# Patient Record
Sex: MALE | Race: WHITE | NOT HISPANIC OR LATINO | Employment: FULL TIME | URBAN - METROPOLITAN AREA
[De-identification: names, ages, dates, MRNs, and addresses within clinical notes are randomized per-mention and may not be internally consistent; named-entity substitution may affect disease eponyms.]

---

## 2017-02-27 ENCOUNTER — TELEPHONE (OUTPATIENT)
Dept: FAMILY MEDICINE | Facility: OTHER | Age: 52
End: 2017-02-27

## 2017-02-27 NOTE — TELEPHONE ENCOUNTER
Panel Management Review      Patient has the following on his problem list:     Diabetes    ASA: Passed    Last A1C  Lab Results   Component Value Date    A1C 6.2 11/21/2016    A1C 6.1 11/17/2015    A1C 6.3 04/29/2015    A1C 6.1 02/19/2015    A1C 6.3 10/20/2014     A1C tested: Failed    Last LDL:    Lab Results   Component Value Date    CHOL 181 11/21/2016     Lab Results   Component Value Date    HDL 44 11/21/2016     Lab Results   Component Value Date     11/21/2016     Lab Results   Component Value Date    TRIG 158 11/21/2016     Lab Results   Component Value Date    CHOLHDLRATIO 3.0 02/19/2015     Lab Results   Component Value Date    NHDL 137 11/21/2016       Is the patient on a Statin? YES             Is the patient on Aspirin? YES    Medications     HMG CoA Reductase Inhibitors    atorvastatin (LIPITOR) 10 MG tablet    Salicylates    aspirin 81 MG tablet          Last three blood pressure readings:  BP Readings from Last 3 Encounters:   11/21/16 132/84   04/15/16 112/68   12/17/15 130/74       Date of last diabetes office visit: 11/21/16     Tobacco History:     History   Smoking Status     Never Smoker   Smokeless Tobacco     Never Used         Hypertension   Last three blood pressure readings:  BP Readings from Last 3 Encounters:   11/21/16 132/84   04/15/16 112/68   12/17/15 130/74     Blood pressure: Passed    HTN Guidelines:  Age 18-59 BP range:  Less than 140/90  Age 60-85 with Diabetes:  Less than 140/90  Age 60-85 without Diabetes:  less than 150/90      Composite cancer screening  Chart review shows that this patient is due/due soon for the following None  Summary:    Patient is due/failing the following:   creatinine, A1C and LDL    Action needed:   Patient needs fasting lab only appointment    Type of outreach:    Phone, left message for patient to call back.     Questions for provider review:    None                                                                                                                                     Ese Torres MA       Chart routed to Care Team .

## 2017-03-07 NOTE — TELEPHONE ENCOUNTER
Patient states he was advised at last OV on 11/21/16 to follow up in 6 months. He will schedule in May.

## 2017-04-19 DIAGNOSIS — E78.5 HYPERLIPIDEMIA LDL GOAL <100: ICD-10-CM

## 2017-04-19 NOTE — TELEPHONE ENCOUNTER
atorvastatin (LIPITOR) 10 MG tablet     Last Written Prescription Date: 11/21/16  Last Fill Quantity: 30, # refills: 5  Last Office Visit with G, P or [facility] prescribing provider: 11/21/16       Lab Results   Component Value Date    CHOL 181 11/21/2016     Lab Results   Component Value Date    HDL 44 11/21/2016     Lab Results   Component Value Date     11/21/2016     Lab Results   Component Value Date    TRIG 158 11/21/2016     Lab Results   Component Value Date    CHOLHDLRATIO 3.0 02/19/2015

## 2017-04-20 RX ORDER — ATORVASTATIN CALCIUM 10 MG/1
TABLET, FILM COATED ORAL
Qty: 30 TABLET | Refills: 5 | Status: SHIPPED | OUTPATIENT
Start: 2017-04-20 | End: 2017-11-17

## 2017-04-20 NOTE — TELEPHONE ENCOUNTER
Prescription approved per [facility] [place].  Shahab Ortzi RN

## 2017-05-30 ENCOUNTER — TELEPHONE (OUTPATIENT)
Dept: FAMILY MEDICINE | Facility: OTHER | Age: 52
End: 2017-05-30

## 2017-05-30 ENCOUNTER — MYC MEDICAL ADVICE (OUTPATIENT)
Dept: FAMILY MEDICINE | Facility: OTHER | Age: 52
End: 2017-05-30

## 2017-05-30 NOTE — PROGRESS NOTES
"  SUBJECTIVE:                                                    Taiwo Escobar is a 51 year old male who presents to clinic today for the following health issues:    HPI    Diabetes Follow-up      Patient is checking blood sugars: 2-3 times a week, 125-140 in the morning, evenings are 100-125    Diabetic concerns: None     Symptoms of hypoglycemia (low blood sugar): none     Paresthesias (numbness or burning in feet) or sores: No     Date of last diabetic eye exam: 11/2016     He states his diabetes has been under good control.    He checks home blood pressures and the systolic is usually in the 130s and the diastolic is usually in the 70's.     He is tolerating the Lipitor well without any muscle aching.     Problem list and histories reviewed & adjusted, as indicated.  Additional history: none    ROS:  GENERAL: Denies fever, fatigue, weakness, weight gain, or weight loss.  CARDIOVASCULAR: Denies chest pain, shortness of breath, irregular heartbeats, palpitations, or edema.  RESPIRATORY: Denies cough, hemoptysis, and shortness of breath.  NEUROLOGIC: Denies headache, fainting, dizziness, memory loss, numbness, tingling, or seizures.  PSYCHIATRIC: Denies depression, anxiety, mood swings, and thoughts of suicide.    OBJECTIVE:                                                    /78  Pulse 80  Temp 97.6  F (36.4  C) (Temporal)  Resp 16  Ht 5' 10.35\" (1.787 m)  Wt 220 lb 12.8 oz (100.2 kg)  BMI 31.36 kg/m2  Body mass index is 31.36 kg/(m^2).  GENERAL: healthy, alert and no distress  RESP: lungs clear to auscultation - no rales, rhonchi or wheezes  CV: regular rate and rhythm, normal S1 S2, no S3 or S4, no murmur, click or rub, no peripheral edema and peripheral pulses strong  NEURO: Normal strength and tone, mentation intact and speech normal    Diagnostic Test Results:  Lab Results   Component Value Date    A1C 6.2 06/02/2017    A1C 6.2 11/21/2016    A1C 6.1 11/17/2015    A1C 6.3 04/29/2015    A1C 6.1 " 02/19/2015        ASSESSMENT/PLAN:                                                        ICD-10-CM    1. Type 2 diabetes mellitus without complication, without long-term current use of insulin (H) E11.9 HEMOGLOBIN A1C   2. Essential hypertension I10 Basic metabolic panel  (Ca, Cl, CO2, Creat, Gluc, K, Na, BUN)   3. Obesity due to excess calories, unspecified obesity severity E66.09    4. Hyperlipidemia LDL goal <100 E78.5 Lipid Profile with reflex to direct LDL   5. Erectile dysfunction, unspecified erectile dysfunction type N52.9 sildenafil (REVATIO/VIAGRA) 20 MG tablet       1, 3. A1c is stable at 6.2. I recommend he continue with the current dose of metformin along with a healthy diet and exercise. Follow up in 6 months.    2. Initial BP was slightly elevated but repeat was normal. He will continue to monitor his home pressure and if consistently above 140/90, he will let me know.    4. Will order updated fasting lipid panel to make sure the Lipitor is working well.    5. Will refill Viagra to use as needed.    Moises Bell PA-C  [facility]

## 2017-05-30 NOTE — TELEPHONE ENCOUNTER
Panel Management Review      Patient has the following on his problem list:     Diabetes    ASA: Passed    Last A1C  Lab Results   Component Value Date    A1C 6.2 11/21/2016    A1C 6.1 11/17/2015    A1C 6.3 04/29/2015    A1C 6.1 02/19/2015    A1C 6.3 10/20/2014     A1C tested: Passed    Last LDL:    Lab Results   Component Value Date    CHOL 181 11/21/2016     Lab Results   Component Value Date    HDL 44 11/21/2016     Lab Results   Component Value Date     11/21/2016     Lab Results   Component Value Date    TRIG 158 11/21/2016     Lab Results   Component Value Date    CHOLHDLRATIO 3.0 02/19/2015     Lab Results   Component Value Date    NHDL 137 11/21/2016       Is the patient on a Statin? YES             Is the patient on Aspirin? YES    Medications     HMG CoA Reductase Inhibitors    atorvastatin (LIPITOR) 10 MG tablet    Salicylates    aspirin 81 MG tablet          Last three blood pressure readings:  BP Readings from Last 3 Encounters:   11/21/16 132/84   04/15/16 112/68   12/17/15 130/74       Date of last diabetes office visit: 11/21/16     Tobacco History:     History   Smoking Status     Never Smoker   Smokeless Tobacco     Never Used         Hypertension   Last three blood pressure readings:  BP Readings from Last 3 Encounters:   11/21/16 132/84   04/15/16 112/68   12/17/15 130/74     Blood pressure: Passed    HTN Guidelines:  Age 18-59 BP range:  Less than 140/90  Age 60-85 with Diabetes:  Less than 140/90  Age 60-85 without Diabetes:  less than 150/90      Composite cancer screening  Chart review shows that this patient is due/due soon for the following None  Summary:    Patient is due/failing the following:   A1C    Action needed:   Patient needs office visit for diabetes, htn. and Patient needs fasting lab only appointment    Type of outreach:    Phone, left message for patient to call back.     Questions for provider review:    None                                                                                                                                     Irina Banegas, [facility]       Chart routed to Care Team .

## 2017-06-02 ENCOUNTER — OFFICE VISIT (OUTPATIENT)
Dept: FAMILY MEDICINE | Facility: OTHER | Age: 52
End: 2017-06-02
Payer: COMMERCIAL

## 2017-06-02 VITALS
DIASTOLIC BLOOD PRESSURE: 78 MMHG | WEIGHT: 220.8 LBS | TEMPERATURE: 97.6 F | SYSTOLIC BLOOD PRESSURE: 130 MMHG | RESPIRATION RATE: 16 BRPM | HEART RATE: 80 BPM | BODY MASS INDEX: 31.61 KG/M2 | HEIGHT: 70 IN

## 2017-06-02 DIAGNOSIS — E78.5 HYPERLIPIDEMIA LDL GOAL <100: ICD-10-CM

## 2017-06-02 DIAGNOSIS — E11.9 TYPE 2 DIABETES MELLITUS WITHOUT COMPLICATION, WITHOUT LONG-TERM CURRENT USE OF INSULIN (H): Primary | ICD-10-CM

## 2017-06-02 DIAGNOSIS — I10 ESSENTIAL HYPERTENSION: ICD-10-CM

## 2017-06-02 DIAGNOSIS — N52.9 ERECTILE DYSFUNCTION, UNSPECIFIED ERECTILE DYSFUNCTION TYPE: ICD-10-CM

## 2017-06-02 DIAGNOSIS — E66.09 OBESITY DUE TO EXCESS CALORIES, UNSPECIFIED OBESITY SEVERITY: ICD-10-CM

## 2017-06-02 LAB — HBA1C MFR BLD: 6.2 % (ref 4.3–6)

## 2017-06-02 PROCEDURE — 80048 BASIC METABOLIC PNL TOTAL CA: CPT | Performed by: PHYSICIAN ASSISTANT

## 2017-06-02 PROCEDURE — 99214 OFFICE O/P EST MOD 30 MIN: CPT | Performed by: PHYSICIAN ASSISTANT

## 2017-06-02 PROCEDURE — 80061 LIPID PANEL: CPT | Performed by: PHYSICIAN ASSISTANT

## 2017-06-02 PROCEDURE — 83036 HEMOGLOBIN GLYCOSYLATED A1C: CPT | Performed by: PHYSICIAN ASSISTANT

## 2017-06-02 PROCEDURE — 36415 COLL VENOUS BLD VENIPUNCTURE: CPT | Performed by: PHYSICIAN ASSISTANT

## 2017-06-02 RX ORDER — SILDENAFIL CITRATE 20 MG/1
TABLET ORAL
Qty: 18 TABLET | Refills: 3 | Status: SHIPPED | OUTPATIENT
Start: 2017-06-02 | End: 2018-08-26

## 2017-06-02 ASSESSMENT — PAIN SCALES - GENERAL: PAINLEVEL: NO PAIN (0)

## 2017-06-02 NOTE — PATIENT INSTRUCTIONS
Will order labs today and notify you of the results.  Will refill the Viagra and have you follow up in 6 months.    Monitor your blood pressure closely and if consistently above 140/90, let me know.

## 2017-06-02 NOTE — NURSING NOTE
"Chief Complaint   Patient presents with     Labs     A1c labs     Panel Management       Initial /72  Pulse 80  Temp 97.6  F (36.4  C) (Temporal)  Resp 16  Ht 5' 10.35\" (1.787 m)  Wt 220 lb 12.8 oz (100.2 kg)  BMI 31.36 kg/m2 Estimated body mass index is 31.36 kg/(m^2) as calculated from the following:    Height as of this encounter: 5' 10.35\" (1.787 m).    Weight as of this encounter: 220 lb 12.8 oz (100.2 kg).  Medication Reconciliation: complete  Servando Jay MA    "

## 2017-06-02 NOTE — MR AVS SNAPSHOT
After Visit Summary   6/2/2017    Taiwo Escobar    MRN: [id number]           Patient Information     Date Of Birth          1965        Visit Information        Provider Department      6/2/2017 3:30 PM Moises Bell PA-C [facility]        Today's Diagnoses     Type 2 diabetes mellitus without complication, without long-term current use of insulin (H)    -  1    Essential hypertension        Obesity due to excess calories, unspecified obesity severity        Hyperlipidemia LDL goal <100        Erectile dysfunction, unspecified erectile dysfunction type          Care Instructions    Will order labs today and notify you of the results.  Will refill the Viagra and have you follow up in 6 months.    Monitor your blood pressure closely and if consistently above 140/90, let me know.              Follow-ups after your visit        Follow-up notes from your care team     Return in about 6 months (around 12/2/2017) for Lab Work, Routine Visit.      Who to contact     If you have questions or need follow up information about today's clinic visit or your schedule please contact [facility] directly at [phone].  Normal or non-critical lab and imaging results will be communicated to you by [employer], letter or phone within 4 business days after the clinic has received the results. If you do not hear from us within 7 days, please contact the clinic through [employer] or phone. If you have a critical or abnormal lab result, we will notify you by phone as soon as possible.  Submit refill requests through [employer] or call your pharmacy and they will forward the refill request to us. Please allow 3 business days for your refill to be completed.          Additional Information About Your Visit        MyChart Information     [employer] gives you secure access to your electronic health record. If you see a primary care provider, you can also send messages to your care team and  "make appointments. If you have questions, please call your primary care clinic.  If you do not have a primary care provider, please call [phone] and they will assist you.        Care EveryWhere ID     This is your Care EveryWhere ID. This could be used by other organizations to access your [facility] medical records  [id number]        Your Vitals Were     Pulse Temperature Respirations Height BMI (Body Mass Index)       80 97.6  F (36.4  C) (Temporal) 16 5' 10.35\" (1.787 m) 31.36 kg/m2        Blood Pressure from Last 3 Encounters:   06/02/17 130/78   11/21/16 132/84   04/15/16 112/68    Weight from Last 3 Encounters:   06/02/17 220 lb 12.8 oz (100.2 kg)   11/21/16 214 lb (97.1 kg)   11/17/15 215 lb (97.5 kg)              We Performed the Following     Basic metabolic panel  (Ca, Cl, CO2, Creat, Gluc, K, Na, BUN)     HEMOGLOBIN A1C     Lipid Profile with reflex to direct LDL          Where to get your medicines      These medications were sent to [place], [place] - [facility] [address]  [address] [address]     Phone:  [phone]     sildenafil 20 MG tablet          Primary Care Provider Office Phone # Fax #    Moises Bell PA-C [phone]       [facility] [address]  [facility] [address]        Thank you!     Thank you for choosing [facility]  for your care. Our goal is always to provide you with excellent care. Hearing back from our patients is one way we can continue to improve our services. Please take a few minutes to complete the written survey that you may receive in the mail after your visit with us. Thank you!             Your Updated Medication List - Protect others around you: Learn how to safely use, store and throw away your medicines at www.disposemymeds.org.          This list is accurate as of: 6/2/17  3:49 PM.  Always use your most recent med list.                   Brand Name Dispense " Instructions for use    aspirin 81 MG tablet     90 tablet    Take 1 tablet (81 mg) by mouth daily       atorvastatin 10 MG tablet    LIPITOR    30 tablet    TAKE 1 TABLET (10 MG) BY MOUTH DAILY       lisinopril-hydrochlorothiazide 20-25 MG per tablet    PRINZIDE/ZESTORETIC    90 tablet    Take 1 tablet by mouth daily       metFORMIN 500 MG 24 hr tablet    GLUCOPHAGE-XR    90 tablet    Take 1 tablet (500 mg) by mouth daily       sildenafil 20 MG tablet    REVATIO/VIAGRA    18 tablet    1-2 tablets 30 min to 4 hours before intercourse.       sildenafil 50 MG cap/tab    REVATIO/VIAGRA    6 tablet    Take 0.5-1 tablets (25-50 mg) by mouth daily as needed for erectile dysfunction Take 30 min to 4 hours before intercourse.

## 2017-06-03 LAB
ANION GAP SERPL CALCULATED.3IONS-SCNC: 11 MMOL/L (ref 3–14)
BUN SERPL-MCNC: 21 MG/DL (ref 7–30)
CALCIUM SERPL-MCNC: 8.8 MG/DL (ref 8.5–10.1)
CHLORIDE SERPL-SCNC: 97 MMOL/L (ref 94–109)
CHOLEST SERPL-MCNC: 103 MG/DL
CO2 SERPL-SCNC: 25 MMOL/L (ref 20–32)
CREAT SERPL-MCNC: 1.26 MG/DL (ref 0.66–1.25)
GFR SERPL CREATININE-BSD FRML MDRD: 60 ML/MIN/1.7M2
GLUCOSE SERPL-MCNC: 95 MG/DL (ref 70–99)
HDLC SERPL-MCNC: 51 MG/DL
LDLC SERPL CALC-MCNC: 40 MG/DL
NONHDLC SERPL-MCNC: 52 MG/DL
POTASSIUM SERPL-SCNC: 4 MMOL/L (ref 3.4–5.3)
SODIUM SERPL-SCNC: 133 MMOL/L (ref 133–144)
TRIGL SERPL-MCNC: 59 MG/DL

## 2017-11-17 ENCOUNTER — TELEPHONE (OUTPATIENT)
Dept: FAMILY MEDICINE | Facility: OTHER | Age: 52
End: 2017-11-17

## 2017-11-17 DIAGNOSIS — E11.9 TYPE 2 DIABETES MELLITUS WITHOUT COMPLICATION, WITHOUT LONG-TERM CURRENT USE OF INSULIN (H): ICD-10-CM

## 2017-11-17 DIAGNOSIS — E78.5 HYPERLIPIDEMIA LDL GOAL <100: ICD-10-CM

## 2017-11-17 DIAGNOSIS — I10 BENIGN ESSENTIAL HYPERTENSION: ICD-10-CM

## 2017-11-20 RX ORDER — METFORMIN HCL 500 MG
TABLET, EXTENDED RELEASE 24 HR ORAL
Qty: 30 TABLET | Refills: 0 | Status: SHIPPED | OUTPATIENT
Start: 2017-11-20 | End: 2017-12-06

## 2017-11-20 RX ORDER — LISINOPRIL AND HYDROCHLOROTHIAZIDE 20; 25 MG/1; MG/1
TABLET ORAL
Qty: 30 TABLET | Refills: 0 | Status: SHIPPED | OUTPATIENT
Start: 2017-11-20 | End: 2017-12-06

## 2017-11-20 RX ORDER — ATORVASTATIN CALCIUM 10 MG/1
TABLET, FILM COATED ORAL
Qty: 30 TABLET | Refills: 0 | Status: SHIPPED | OUTPATIENT
Start: 2017-11-20 | End: 2017-12-06

## 2017-11-20 NOTE — TELEPHONE ENCOUNTER
Requested Prescriptions   Pending Prescriptions Disp Refills     atorvastatin (LIPITOR) 10 MG tablet [Pharmacy Med Name: ATORVASTATIN CALCIUM 10 MG TABLET] 30 tablet      Sig: TAKE 1 TABLET (10 MG) BY MOUTH DAILY    Statins Protocol Passed    11/17/2017  8:00 AM       Passed - LDL on file in past 12 months    Recent Labs   Lab Test  06/02/17   1549   LDL  40            Passed - No abnormal creatine kinase in past 12 months    No lab results found.         Passed - Recent or future visit with authorizing provider    Patient had office visit in the last year or has a visit in the next 30 days with authorizing provider.  See chart review.     6/2/17         Passed - Patient is age 18 or older        lisinopril-hydrochlorothiazide (PRINZIDE/ZESTORETIC) 20-25 MG per tablet [Pharmacy Med Name: LISINOPRIL-HYDROCHLOROTHIAZIDE 20 MG-25MG TABLET] 30 tablet      Sig: TAKE 1 TABLET BY MOUTH DAILY    Diuretics (Including Combos) Protocol Failed    11/17/2017  8:00 AM       Failed - Normal serum creatinine on file in past 12 months    Recent Labs   Lab Test  06/02/17   1549   CR  1.26*             Passed - Blood pressure under 140/90    BP Readings from Last 3 Encounters:   06/02/17 130/78   11/21/16 132/84   04/15/16 112/68                Passed - Recent or future visit with authorizing provider's specialty    Patient had office visit in the last year or has a visit in the next 30 days with authorizing provider.  See chart review.              Passed - Patient is age 18 or older       Passed - Normal serum potassium on file in past 12 months    Recent Labs   Lab Test  06/02/17   1549   POTASSIUM  4.0                   Passed - Normal serum sodium on file in past 12 months    Recent Labs   Lab Test  06/02/17   1549   NA  133              metFORMIN (GLUCOPHAGE-XR) 500 MG 24 hr tablet [Pharmacy Med Name: METFORMIN HCL  MG TAB ER 24H] 30 tablet      Sig: TAKE 1 TABLET (500 MG) BY MOUTH DAILY    Biguanide Agents Passed     11/17/2017  8:00 AM       Passed - Patient's BP is less than 140/90    BP Readings from Last 3 Encounters:   06/02/17 130/78   11/21/16 132/84   04/15/16 112/68                Passed - Patient has documented LDL within the past 12 mos.    Recent Labs   Lab Test  06/02/17   1549   LDL  40            Passed - Patient has had a Microalbumin in the past 12 mos.    Recent Labs   Lab Test  11/21/16   0923   MICROL  72   UMALCR  28.53*            Passed - Patient is age 10 or older       Passed - Patient has documented A1c within the specified period of time.    Recent Labs   Lab Test  06/02/17   1549   A1C  6.2*            Passed - Patient's CR is NOT>1.4 OR Patient's EGFR is NOT<45 within past 12 mos.    Recent Labs   Lab Test  06/02/17   1549   GFRESTIMATED  60*   GFRESTBLACK  72       Recent Labs   Lab Test  06/02/17   1549   CR  1.26*            Passed - Patient does NOT have a diagnosis of CHF.       Passed - Recent (6 mos) or future visit with authorizing provider's specialty    Patient had office visit in the last 6 months or has a visit in the next 30 days with authorizing provider.  See chart review.             Medication is being filled for 1 time refill only due to:  Patient needs to be seen because due for DM ov..   Please call and help schedule.    Shahab Ortiz, RN, BSN

## 2017-11-20 NOTE — TELEPHONE ENCOUNTER
LM for patient that he needs an OV prior to anymore refills- medications refilled for 30 days currently.  Kathleen Cardona CMA ([facility])

## 2017-11-30 NOTE — PROGRESS NOTES
SUBJECTIVE:                                                    Taiwo Escobar is a 52 year old male who presents to clinic today for the following health issues:      History of Present Illness     Diabetes:     Frequency of checking blood sugars::  Weekly    Diabetic concerns::  None    Hypoglycemia symptoms::  None    Paraesthesia present::  No    Eye Exam in the last year::  Yes    11\17\2017    Hyperlipidemia:     Low fat/chol diet rating::  Fair    Taking Statins::  No    Lipid Medications or Supplements::  None    Hypertension:     Outpatient blood pressures:  Are being checked    Blood pressures checked at:  Home    Dietary sodium intake::  No added salt diet    Diet:  Low salt and Diabetic  Frequency of exercise:  2-3 days/week  Duration of exercise:  30-45 minutes  Taking medications regularly:  Yes  Medication side effects:  None  Additional concerns today:  No      Glucose in the mornings has been running in the 130's and in the low 100's in the evenings. He just got a new membership to a local fitness center.     Answers for HPI/ROS submitted by the patient on 12/6/2017   PHQ-2 Score: 0      Problem list and histories reviewed & adjusted, as indicated.  Additional history: none    ROS:  GENERAL: Denies fever, fatigue, weakness, weight gain, or weight loss.  CARDIOVASCULAR: Denies chest pain, shortness of breath, irregular heartbeats,  palpitations, or edema.  RESPIRATORY: Denies cough, hemoptysis, and shortness of breath.   NEUROLOGIC: Denies headache, fainting, dizziness, memory loss, numbness, tingling, or seizures.    OBJECTIVE:     /84 (BP Location: Right arm, Patient Position: Chair, Cuff Size: Adult Regular)  Pulse 69  Temp 97.5  F (36.4  C) (Temporal)  Resp 14  Wt 227 lb (103 kg)  SpO2 100%  BMI 32.24 kg/m2  Body mass index is 32.24 kg/(m^2).  GENERAL: healthy, alert and no distress  RESP: lungs clear to auscultation - no rales, rhonchi or wheezes  CV: regular rate and rhythm,  normal S1 S2, no S3 or S4, no murmur, click or rub, no peripheral edema and peripheral pulses strong  NEURO: Normal strength and tone, mentation intact and speech normal  Diabetic foot exam: normal DP and PT pulses, no trophic changes or ulcerative lesions, normal sensory exam and normal monofilament exam    Diagnostic Test Results:  Lab Results   Component Value Date    A1C 6.3 12/06/2017    A1C 6.2 06/02/2017    A1C 6.2 11/21/2016    A1C 6.1 11/17/2015    A1C 6.3 04/29/2015         ASSESSMENT/PLAN:       ICD-10-CM    1. Type 2 diabetes mellitus without complication, without long-term current use of insulin (H) E11.9 FOOT EXAM  NO CHARGE [15105.114]     HEMOGLOBIN A1C     Albumin Random Urine Quantitative with Creat Ratio     Basic metabolic panel  (Ca, Cl, CO2, Creat, Gluc, K, Na, BUN)     metFORMIN (GLUCOPHAGE-XR) 500 MG 24 hr tablet     **A1C FUTURE 6mo   2. Essential hypertension I10 Basic metabolic panel  (Ca, Cl, CO2, Creat, Gluc, K, Na, BUN)     lisinopril-hydrochlorothiazide (PRINZIDE/ZESTORETIC) 20-25 MG per tablet   3. Hyperlipidemia LDL goal <100 E78.5 atorvastatin (LIPITOR) 10 MG tablet   4. Erectile dysfunction, unspecified erectile dysfunction type N52.9    5. Elevated serum creatinine R79.89        1. A1c stable at 6.3 so will continue current dose of metformin. He will continue to work on a healthy diet and routine exercise and follow up in 6 months with a repeat A1c.     2. Stable, continue current medication.    3. Continue Lipitor.    4. Does not need refills of sildenafil at this time.    5. First noted last November and had improved in June. He is staying hydrated and trying to avoid NSAIDs. If starts to worsen again, may need to consider changing metformin and hydrochlorithiazide to different medications.     Moises Bell PA-C  [facility]

## 2017-12-06 ENCOUNTER — OFFICE VISIT (OUTPATIENT)
Dept: FAMILY MEDICINE | Facility: OTHER | Age: 52
End: 2017-12-06
Payer: COMMERCIAL

## 2017-12-06 VITALS
OXYGEN SATURATION: 100 % | HEART RATE: 69 BPM | TEMPERATURE: 97.5 F | DIASTOLIC BLOOD PRESSURE: 84 MMHG | SYSTOLIC BLOOD PRESSURE: 138 MMHG | BODY MASS INDEX: 32.24 KG/M2 | RESPIRATION RATE: 14 BRPM | WEIGHT: 227 LBS

## 2017-12-06 DIAGNOSIS — N52.9 ERECTILE DYSFUNCTION, UNSPECIFIED ERECTILE DYSFUNCTION TYPE: ICD-10-CM

## 2017-12-06 DIAGNOSIS — E11.9 TYPE 2 DIABETES MELLITUS WITHOUT COMPLICATION, WITHOUT LONG-TERM CURRENT USE OF INSULIN (H): Primary | ICD-10-CM

## 2017-12-06 DIAGNOSIS — I10 ESSENTIAL HYPERTENSION: ICD-10-CM

## 2017-12-06 DIAGNOSIS — R79.89 ELEVATED SERUM CREATININE: ICD-10-CM

## 2017-12-06 DIAGNOSIS — E78.5 HYPERLIPIDEMIA LDL GOAL <100: ICD-10-CM

## 2017-12-06 LAB
ANION GAP SERPL CALCULATED.3IONS-SCNC: 8 MMOL/L (ref 3–14)
BUN SERPL-MCNC: 16 MG/DL (ref 7–30)
CALCIUM SERPL-MCNC: 8.7 MG/DL (ref 8.5–10.1)
CHLORIDE SERPL-SCNC: 96 MMOL/L (ref 94–109)
CO2 SERPL-SCNC: 27 MMOL/L (ref 20–32)
CREAT SERPL-MCNC: 0.96 MG/DL (ref 0.66–1.25)
CREAT UR-MCNC: <3 MG/DL
GFR SERPL CREATININE-BSD FRML MDRD: 81 ML/MIN/1.7M2
GLUCOSE SERPL-MCNC: 95 MG/DL (ref 70–99)
HBA1C MFR BLD: 6.3 % (ref 4.3–6)
MICROALBUMIN UR-MCNC: 10 MG/L
MICROALBUMIN/CREAT UR: NORMAL MG/G CR (ref 0–17)
POTASSIUM SERPL-SCNC: 3.7 MMOL/L (ref 3.4–5.3)
SODIUM SERPL-SCNC: 131 MMOL/L (ref 133–144)

## 2017-12-06 PROCEDURE — 80048 BASIC METABOLIC PNL TOTAL CA: CPT | Performed by: PHYSICIAN ASSISTANT

## 2017-12-06 PROCEDURE — 99207 C FOOT EXAM  NO CHARGE: CPT | Performed by: PHYSICIAN ASSISTANT

## 2017-12-06 PROCEDURE — 82043 UR ALBUMIN QUANTITATIVE: CPT | Performed by: PHYSICIAN ASSISTANT

## 2017-12-06 PROCEDURE — 83036 HEMOGLOBIN GLYCOSYLATED A1C: CPT | Performed by: PHYSICIAN ASSISTANT

## 2017-12-06 PROCEDURE — 99214 OFFICE O/P EST MOD 30 MIN: CPT | Mod: 25 | Performed by: PHYSICIAN ASSISTANT

## 2017-12-06 PROCEDURE — 36415 COLL VENOUS BLD VENIPUNCTURE: CPT | Performed by: PHYSICIAN ASSISTANT

## 2017-12-06 RX ORDER — METFORMIN HCL 500 MG
TABLET, EXTENDED RELEASE 24 HR ORAL
Qty: 90 TABLET | Refills: 1 | Status: SHIPPED | OUTPATIENT
Start: 2017-12-06 | End: 2018-06-22

## 2017-12-06 RX ORDER — SILDENAFIL CITRATE 20 MG/1
TABLET ORAL
Qty: 18 TABLET | Refills: 3 | Status: CANCELLED | OUTPATIENT
Start: 2017-12-06

## 2017-12-06 RX ORDER — ATORVASTATIN CALCIUM 10 MG/1
TABLET, FILM COATED ORAL
Qty: 90 TABLET | Refills: 3 | Status: SHIPPED | OUTPATIENT
Start: 2017-12-06 | End: 2018-12-10

## 2017-12-06 RX ORDER — LISINOPRIL AND HYDROCHLOROTHIAZIDE 20; 25 MG/1; MG/1
1 TABLET ORAL DAILY
Qty: 90 TABLET | Refills: 3 | Status: SHIPPED | OUTPATIENT
Start: 2017-12-06 | End: 2018-12-10

## 2017-12-06 NOTE — PATIENT INSTRUCTIONS
Continue to work on a healthy diet with routine exercise.  Will continue current dose of medications.  Follow up in 6 months for a recheck.

## 2017-12-06 NOTE — MR AVS SNAPSHOT
After Visit Summary   12/6/2017    Taiwo Escobar    MRN: [id number]           Patient Information     Date Of Birth          1965        Visit Information        Provider Department      12/6/2017 4:15 PM Moises Bell PA-C [facility]        Today's Diagnoses     Type 2 diabetes mellitus without complication, without long-term current use of insulin (H)    -  1    Essential hypertension        Need for prophylactic vaccination and inoculation against influenza        Hyperlipidemia LDL goal <100        Benign essential hypertension        Erectile dysfunction, unspecified erectile dysfunction type          Care Instructions    Continue to work on a healthy diet with routine exercise.  Will continue current dose of medications.  Follow up in 6 months for a recheck.              Follow-ups after your visit        Follow-up notes from your care team     Return in about 6 months (around 6/6/2018) for Routine Visit, Lab Work.      Who to contact     If you have questions or need follow up information about today's clinic visit or your schedule please contact [facility] directly at [phone].  Normal or non-critical lab and imaging results will be communicated to you by Varthanahart, letter or phone within 4 business days after the clinic has received the results. If you do not hear from us within 7 days, please contact the clinic through [employer] or phone. If you have a critical or abnormal lab result, we will notify you by phone as soon as possible.  Submit refill requests through [employer] or call your pharmacy and they will forward the refill request to us. Please allow 3 business days for your refill to be completed.          Additional Information About Your Visit        Varthanahart Information     [employer] gives you secure access to your electronic health record. If you see a primary care provider, you can also send messages to your care team and make  appointments. If you have questions, please call your primary care clinic.  If you do not have a primary care provider, please call [phone] and they will assist you.        Care EveryWhere ID     This is your Care EveryWhere ID. This could be used by other organizations to access your [facility] medical records  [id number]        Your Vitals Were     Pulse Temperature Respirations Pulse Oximetry BMI (Body Mass Index)       69 97.5  F (36.4  C) (Temporal) 14 100% 32.24 kg/m2        Blood Pressure from Last 3 Encounters:   12/06/17 138/84   06/02/17 130/78   11/21/16 132/84    Weight from Last 3 Encounters:   12/06/17 227 lb (103 kg)   06/02/17 220 lb 12.8 oz (100.2 kg)   11/21/16 214 lb (97.1 kg)              We Performed the Following     Albumin Random Urine Quantitative with Creat Ratio     Basic metabolic panel  (Ca, Cl, CO2, Creat, Gluc, K, Na, BUN)     FOOT EXAM  NO CHARGE [[id number]]     HEMOGLOBIN A1C          Today's Medication Changes          These changes are accurate as of: 12/6/17  4:45 PM.  If you have any questions, ask your nurse or doctor.               These medicines have changed or have updated prescriptions.        Dose/Directions    atorvastatin 10 MG tablet   Commonly known as:  LIPITOR   This may have changed:  See the new instructions.   Used for:  Hyperlipidemia LDL goal <100   Changed by:  Moises Bell PA-C        TAKE 1 TABLET (10 MG) BY MOUTH DAILY   Quantity:  90 tablet   Refills:  3       lisinopril-hydrochlorothiazide 20-25 MG per tablet   Commonly known as:  PRINZIDE/ZESTORETIC   This may have changed:  See the new instructions.   Used for:  Benign essential hypertension   Changed by:  Moises Bell PA-C        Dose:  1 tablet   Take 1 tablet by mouth daily   Quantity:  90 tablet   Refills:  3       metFORMIN 500 MG 24 hr tablet   Commonly known as:  GLUCOPHAGE-XR   This may have changed:  See the new instructions.   Used for:  Type 2 diabetes mellitus without  complication, without long-term current use of insulin (H)   Changed by:  Moises Bell PA-C        TAKE 1 TABLET (500 MG) BY MOUTH DAILY   Quantity:  90 tablet   Refills:  1            Where to get your medicines      These medications were sent to [place], [place] - [place], [place] - [address]  [address], [facility] [address]     Phone:  [phone]     atorvastatin 10 MG tablet    lisinopril-hydrochlorothiazide 20-25 MG per tablet    metFORMIN 500 MG 24 hr tablet                Primary Care Provider Office Phone # Fax #    Moises Bell PA-C [phone]       [address] [facility]   [facility] [address]        Equal Access to Services     HENRIQUE SHARMA : Hadii gab sy hadasho Soomaali, waaxda luqadaha, qaybta kaalmada adeegyada, waxozzy bhat . So [facility] [phone].    ATENCIÓN: Si habla español, tiene a dela cruz disposición servicios gratuitos de asistencia lingüística. [facility] [phone].    We comply with applicable federal civil rights laws and [place] laws. We do not discriminate on the basis of race, color, national origin, age, disability, sex, sexual orientation, or gender identity.            Thank you!     Thank you for choosing [facility]  for your care. Our goal is always to provide you with excellent care. Hearing back from our patients is one way we can continue to improve our services. Please take a few minutes to complete the written survey that you may receive in the mail after your visit with us. Thank you!             Your Updated Medication List - Protect others around you: Learn how to safely use, store and throw away your medicines at www.disposemymeds.org.          This list is accurate as of: 12/6/17  4:45 PM.  Always use your most recent med list.                   Brand Name Dispense Instructions for use Diagnosis    aspirin 81 MG tablet     90 tablet    Take 1 tablet (81 mg) by mouth daily     Type 2 diabetes mellitus without complication (H)       atorvastatin 10 MG tablet    LIPITOR    90 tablet    TAKE 1 TABLET (10 MG) BY MOUTH DAILY    Hyperlipidemia LDL goal <100       lisinopril-hydrochlorothiazide 20-25 MG per tablet    PRINZIDE/ZESTORETIC    90 tablet    Take 1 tablet by mouth daily    Benign essential hypertension       metFORMIN 500 MG 24 hr tablet    GLUCOPHAGE-XR    90 tablet    TAKE 1 TABLET (500 MG) BY MOUTH DAILY    Type 2 diabetes mellitus without complication, without long-term current use of insulin (H)       sildenafil 20 MG tablet    REVATIO    18 tablet    1-2 tablets 30 min to 4 hours before intercourse.    Erectile dysfunction, unspecified erectile dysfunction type       sildenafil 50 MG tablet    VIAGRA    6 tablet    Take 0.5-1 tablets (25-50 mg) by mouth daily as needed for erectile dysfunction Take 30 min to 4 hours before intercourse.    Erectile dysfunction, unspecified erectile dysfunction type

## 2017-12-06 NOTE — NURSING NOTE
"Chief Complaint   Patient presents with     Recheck Medication     Health Maintenance     flu, a1c, microalbumin, eye exam, foot exam       Initial /84 (BP Location: Right arm, Patient Position: Chair, Cuff Size: Adult Regular)  Pulse 69  Temp 97.5  F (36.4  C) (Temporal)  Resp 14  Wt 227 lb (103 kg)  SpO2 100%  BMI 32.24 kg/m2 Estimated body mass index is 32.24 kg/(m^2) as calculated from the following:    Height as of 6/2/17: 5' 10.35\" (1.787 m).    Weight as of this encounter: 227 lb (103 kg).  Medication Reconciliation: complete     Juliane Linton CMA      "

## 2018-06-22 DIAGNOSIS — E11.9 TYPE 2 DIABETES MELLITUS WITHOUT COMPLICATION, WITHOUT LONG-TERM CURRENT USE OF INSULIN (H): ICD-10-CM

## 2018-06-25 RX ORDER — METFORMIN HCL 500 MG
TABLET, EXTENDED RELEASE 24 HR ORAL
Qty: 30 TABLET | Refills: 0 | Status: SHIPPED | OUTPATIENT
Start: 2018-06-25 | End: 2018-06-28

## 2018-06-25 NOTE — TELEPHONE ENCOUNTER
"Requested Prescriptions   Pending Prescriptions Disp Refills     metFORMIN (GLUCOPHAGE-XR) 500 MG 24 hr tablet [Pharmacy Med Name: METFORMIN HCL  MG TAB ER 24H] 90 tablet      Sig: TAKE 1 TABLET (500 MG) BY MOUTH DAILY    Biguanide Agents Failed    6/22/2018  8:00 AM       Failed - Blood pressure less than 140/90 in past 6 months    BP Readings from Last 3 Encounters:   12/06/17 138/84   06/02/17 130/78   11/21/16 132/84          Failed - Patient has documented LDL within the past 12 mos.    Recent Labs   Lab Test  06/02/17   1549   LDL  40          Failed - Patient has documented A1c within the specified period of time.    If HgbA1C is 8 or greater, it needs to be on file within the past 3 months.  If less than 8, must be on file within the past 6 months.     Recent Labs   Lab Test  12/06/17   1621   A1C  6.3*          Failed - Recent (6 mo) or future (30 days) visit within the authorizing provider's specialty    Patient had office visit in the last 6 months or has a visit in the next 30 days with authorizing provider or within the authorizing provider's specialty.  See \"Patient Info\" tab in inbasket, or \"Choose Columns\" in Meds & Orders section of the refill encounter.           Passed - Patient has had a Microalbumin in the past 12 mos.    Recent Labs   Lab Test  12/06/17   1621   MICROL  10   UMALCR  Unable to calculate due to low value          Passed - Patient is age 10 or older       Passed - Patient's CR is NOT>1.4 OR Patient's EGFR is NOT<45 within past 12 mos.    Recent Labs   Lab Test  12/06/17   1621   GFRESTIMATED  81   GFRESTBLACK  >90     Recent Labs   Lab Test  12/06/17   1621   CR  0.96          Passed - Patient does NOT have a diagnosis of CHF.      metFORMIN (GLUCOPHAGE-XR) 500 MG 24 hr tablet  Medication is being filled for 1 time refill only due to:  Patient needs to be seen because due for 6 month follow up with fasting labs.   Please assist with scheduling.    Rena Vega, RN, BSN "

## 2018-06-25 NOTE — TELEPHONE ENCOUNTER
LM for patient to return phone call to clinic about message below.  Kathleen Cardona CMA ([facility])

## 2018-06-25 NOTE — PROGRESS NOTES
"  SUBJECTIVE:   Taiwo Escobar is a 52 year old male who presents to clinic today for the following health issues:    History of Present Illness     Diabetes:     Frequency of checking blood sugars::  Weekly    Diabetic concerns::  None    Hypoglycemia symptoms::  None    Paraesthesia present::  No    Eye Exam in the last year::  Yes    11\2017    Diabetes Management Resources    Hyperlipidemia:     Low fat/chol diet rating::  Fair    Taking Statins::  YES    Lipid Medications or Supplements::  None    Hypertension:     Outpatient blood pressures:  Are being checked    Blood pressures checked at:  Home    Dietary sodium intake::  No added salt diet    Diet:  Low salt  Frequency of exercise:  4-5 days/week  Duration of exercise:  30-45 minutes  Taking medications regularly:  Yes  Medication side effects:  None  Additional concerns today:  No      Blood sugars usually run from 110-130. He has been eating out a little bit more due to work.     Problem list and histories reviewed & adjusted, as indicated.  Additional history: none    ROS:  GENERAL: Denies fever, fatigue, weakness, weight gain, or weight loss.  CARDIOVASCULAR: Denies chest pain, shortness of breath, irregular heartbeats, palpitations, or edema.  RESPIRATORY: Denies cough, hemoptysis, and shortness of breath.  NEUROLOGIC: Denies headache, fainting, dizziness, memory loss, numbness, tingling, or seizures.  PSYCHIATRIC: Denies depression, anxiety, mood swings, and thoughts of suicide.    OBJECTIVE:     /80 (BP Location: Left arm, Patient Position: Chair, Cuff Size: Adult Regular)  Pulse 66  Temp 97.3  F (36.3  C) (Temporal)  Resp 12  Ht 5' 10.47\" (1.79 m)  Wt 226 lb 9.6 oz (102.8 kg)  BMI 32.08 kg/m2  Body mass index is 32.08 kg/(m^2).  GENERAL: healthy, alert and no distress  RESP: lungs clear to auscultation - no rales, rhonchi or wheezes  CV: regular rate and rhythm, normal S1 S2, no S3 or S4, no murmur, click or rub, no peripheral " edema  NEURO: Normal strength and tone, mentation intact and speech normal. Gait is stable.   PSYCH: mentation appears normal, affect normal/bright    Diagnostic Test Results:    Lab Results   Component Value Date    A1C 6.3 06/28/2018    A1C 6.3 12/06/2017    A1C 6.2 06/02/2017    A1C 6.2 11/21/2016    A1C 6.1 11/17/2015       ASSESSMENT/PLAN:       ICD-10-CM    1. Type 2 diabetes mellitus without complication, without long-term current use of insulin (H) E11.9 Hemoglobin A1c     metFORMIN (GLUCOPHAGE-XR) 500 MG 24 hr tablet   2. Essential hypertension I10    3. Hyperlipidemia LDL goal <100 E78.5 Lipid panel reflex to direct LDL Fasting         1. A1c is stable at 6.3 so will continue with current dose of metformin and plan on follow up in 6 months for recheck. He will continue to work on a healthy diet and routine exercise.    2. Stable, continue current medications.    3. Updated fasting lipid panel ordered.          Moises Bell PA-C  [facility]

## 2018-06-28 ENCOUNTER — OFFICE VISIT (OUTPATIENT)
Dept: FAMILY MEDICINE | Facility: OTHER | Age: 53
End: 2018-06-28
Payer: COMMERCIAL

## 2018-06-28 VITALS
SYSTOLIC BLOOD PRESSURE: 134 MMHG | DIASTOLIC BLOOD PRESSURE: 80 MMHG | RESPIRATION RATE: 12 BRPM | WEIGHT: 226.6 LBS | HEIGHT: 70 IN | HEART RATE: 66 BPM | TEMPERATURE: 97.3 F | BODY MASS INDEX: 32.44 KG/M2

## 2018-06-28 DIAGNOSIS — I10 ESSENTIAL HYPERTENSION: ICD-10-CM

## 2018-06-28 DIAGNOSIS — E11.9 TYPE 2 DIABETES MELLITUS WITHOUT COMPLICATION, WITHOUT LONG-TERM CURRENT USE OF INSULIN (H): Primary | ICD-10-CM

## 2018-06-28 DIAGNOSIS — E78.5 HYPERLIPIDEMIA LDL GOAL <100: ICD-10-CM

## 2018-06-28 LAB
CHOLEST SERPL-MCNC: 120 MG/DL
HBA1C MFR BLD: 6.3 % (ref 0–5.6)
HDLC SERPL-MCNC: 50 MG/DL
LDLC SERPL CALC-MCNC: 53 MG/DL
NONHDLC SERPL-MCNC: 70 MG/DL
TRIGL SERPL-MCNC: 87 MG/DL

## 2018-06-28 PROCEDURE — 83036 HEMOGLOBIN GLYCOSYLATED A1C: CPT | Performed by: PHYSICIAN ASSISTANT

## 2018-06-28 PROCEDURE — 99214 OFFICE O/P EST MOD 30 MIN: CPT | Performed by: PHYSICIAN ASSISTANT

## 2018-06-28 PROCEDURE — 80061 LIPID PANEL: CPT | Performed by: PHYSICIAN ASSISTANT

## 2018-06-28 PROCEDURE — 36415 COLL VENOUS BLD VENIPUNCTURE: CPT | Performed by: PHYSICIAN ASSISTANT

## 2018-06-28 RX ORDER — METFORMIN HCL 500 MG
TABLET, EXTENDED RELEASE 24 HR ORAL
Qty: 90 TABLET | Refills: 1 | Status: SHIPPED | OUTPATIENT
Start: 2018-06-28 | End: 2018-12-20

## 2018-06-28 NOTE — MR AVS SNAPSHOT
After Visit Summary   6/28/2018    Taiwo Escobar    MRN: [id number]           Patient Information     Date Of Birth          1965        Visit Information        Provider Department      6/28/2018 3:30 PM Moises Bell PA-C [facility]        Today's Diagnoses     Type 2 diabetes mellitus without complication, without long-term current use of insulin (H)    -  1    Essential hypertension        Hyperlipidemia LDL goal <100          Care Instructions    Continue with routine exercise and a healthy diet.  Follow up in 6 months for a recheck.              Follow-ups after your visit        Follow-up notes from your care team     Return in about 6 months (around 12/28/2018) for Routine Visit, Physical Exam, Lab Work.      Who to contact     If you have questions or need follow up information about today's clinic visit or your schedule please contact [facility] directly at [phone].  Normal or non-critical lab and imaging results will be communicated to you by MyChart, letter or phone within 4 business days after the clinic has received the results. If you do not hear from us within 7 days, please contact the clinic through [email or web address] or phone. If you have a critical or abnormal lab result, we will notify you by phone as soon as possible.  Submit refill requests through [employer] or call your pharmacy and they will forward the refill request to us. Please allow 3 business days for your refill to be completed.          Additional Information About Your Visit        MyChart Information     [employer] gives you secure access to your electronic health record. If you see a primary care provider, you can also send messages to your care team and make appointments. If you have questions, please call your primary care clinic.  If you do not have a primary care provider, please call [phone] and they will assist you.        Care EveryWhere ID     This is your Care  "EveryWhere ID. This could be used by other organizations to access your [place] medical records  [id number]        Your Vitals Were     Pulse Temperature Respirations Height BMI (Body Mass Index)       66 97.3  F (36.3  C) (Temporal) 12 5' 10.47\" (1.79 m) 32.08 kg/m2        Blood Pressure from Last 3 Encounters:   06/28/18 134/80   12/06/17 138/84   06/02/17 130/78    Weight from Last 3 Encounters:   06/28/18 226 lb 9.6 oz (102.8 kg)   12/06/17 227 lb (103 kg)   06/02/17 220 lb 12.8 oz (100.2 kg)              We Performed the Following     Hemoglobin A1c     Lipid panel reflex to direct LDL Fasting          Today's Medication Changes          These changes are accurate as of 6/28/18  4:05 PM.  If you have any questions, ask your nurse or doctor.               Stop taking these medicines if you haven't already. Please contact your care team if you have questions.     sildenafil 50 MG tablet   Commonly known as:  VIAGRA   Stopped by:  Moises Bell PA-C                Where to get your medicines      These medications were sent to [address]  [address] [facility] [address]     Phone:  [phone]     metFORMIN 500 MG 24 hr tablet                Primary Care Provider Office Phone # Fax #    Moises Bell PA-C [phone]       [address] [facility] [address]  [facility] [address]        Equal Access to Services     [facility] Center[facility] AH: Hadii gab ku hadasho Soomaali, waaxda luqadaha, qaybta kaalmada adeegyada, aryan blackmon. So [facility] [phone].    ATENCIÓN: Si habla español, tiene a dela cruz disposición servicios gratuitos de asistencia lingüística. Llame al [phone].    We comply with applicable federal civil rights laws and [place] laws. We do not discriminate on the basis of race, color, national origin, age, disability, sex, sexual orientation, or gender identity.            Thank you!     Thank you for " choosing [facility]  for your care. Our goal is always to provide you with excellent care. Hearing back from our patients is one way we can continue to improve our services. Please take a few minutes to complete the written survey that you may receive in the mail after your visit with us. Thank you!             Your Updated Medication List - Protect others around you: Learn how to safely use, store and throw away your medicines at www.disposemymeds.org.          This list is accurate as of 6/28/18  4:05 PM.  Always use your most recent med list.                   Brand Name Dispense Instructions for use Diagnosis    aspirin 81 MG tablet     90 tablet    Take 1 tablet (81 mg) by mouth daily    Type 2 diabetes mellitus without complication (H)       atorvastatin 10 MG tablet    LIPITOR    90 tablet    TAKE 1 TABLET (10 MG) BY MOUTH DAILY    Hyperlipidemia LDL goal <100       lisinopril-hydrochlorothiazide 20-25 MG per tablet    PRINZIDE/ZESTORETIC    90 tablet    Take 1 tablet by mouth daily    Essential hypertension       metFORMIN 500 MG 24 hr tablet    GLUCOPHAGE-XR    90 tablet    TAKE 1 TABLET (500 MG) BY MOUTH DAILY    Type 2 diabetes mellitus without complication, without long-term current use of insulin (H)       sildenafil 20 MG tablet    REVATIO    18 tablet    1-2 tablets 30 min to 4 hours before intercourse.    Erectile dysfunction, unspecified erectile dysfunction type

## 2018-06-28 NOTE — NURSING NOTE
"Chief Complaint   Patient presents with     Recheck Medication     Panel Management     height, hiv, liopid, eye exam, a1c       Initial /80 (BP Location: Left arm, Patient Position: Chair, Cuff Size: Adult Regular)  Pulse 66  Temp 97.3  F (36.3  C) (Temporal)  Resp 12  Ht 5' 10.47\" (1.79 m)  Wt 226 lb 9.6 oz (102.8 kg)  BMI 32.08 kg/m2 Estimated body mass index is 32.08 kg/(m^2) as calculated from the following:    Height as of this encounter: 5' 10.47\" (1.79 m).    Weight as of this encounter: 226 lb 9.6 oz (102.8 kg).  BP completed using cuff size: regular    No obstetric history on file.    The following HM Due: NONE      The following patient reported/Care Every where data was sent to:  P ABSTRACT QUALITY INITIATIVES [01461]       N/a    Leona Cage, [facility]  June 28, 2018           "

## 2018-08-26 DIAGNOSIS — N52.9 ERECTILE DYSFUNCTION, UNSPECIFIED ERECTILE DYSFUNCTION TYPE: ICD-10-CM

## 2018-08-27 RX ORDER — SILDENAFIL CITRATE 20 MG/1
TABLET ORAL
Qty: 18 TABLET | Refills: 3 | Status: SHIPPED | OUTPATIENT
Start: 2018-08-27 | End: 2018-12-20

## 2018-08-27 NOTE — TELEPHONE ENCOUNTER
"Requested Prescriptions   Pending Prescriptions Disp Refills     sildenafil (REVATIO) 20 MG tablet [Pharmacy Med Name: SILDENAFIL 20 MG TABLET] 18 tablet      Si-2 tablets 30 min to 4 hours before intercourse.    Erectile Dysfuction Protocol Passed    2018  1:40 PM       Passed - Absence of nitrates on medication list       Passed - Absence of Alpha Blockers on Med list       Passed - Recent (12 mo) or future (30 days) visit within the authorizing provider's specialty    Patient had office visit in the last 12 months or has a visit in the next 30 days with authorizing provider or within the authorizing provider's specialty.  See \"Patient Info\" tab in inbasket, or \"Choose Columns\" in Meds & Orders section of the refill encounter.           Passed - Patient is age 18 or older        Last ov 2018  Last filled 2017    Prescription approved per [facility] Refill Protocol.  Shahab Ortiz, RN, BSN        "

## 2018-12-10 DIAGNOSIS — I10 ESSENTIAL HYPERTENSION: ICD-10-CM

## 2018-12-10 DIAGNOSIS — E78.5 HYPERLIPIDEMIA LDL GOAL <100: ICD-10-CM

## 2018-12-11 RX ORDER — ATORVASTATIN CALCIUM 10 MG/1
TABLET, FILM COATED ORAL
Qty: 30 TABLET | Refills: 0 | Status: SHIPPED | OUTPATIENT
Start: 2018-12-11 | End: 2018-12-20

## 2018-12-11 RX ORDER — LISINOPRIL AND HYDROCHLOROTHIAZIDE 20; 25 MG/1; MG/1
1 TABLET ORAL DAILY
Qty: 30 TABLET | Refills: 0 | Status: SHIPPED | OUTPATIENT
Start: 2018-12-11 | End: 2018-12-20

## 2018-12-11 NOTE — TELEPHONE ENCOUNTER
"Requested Prescriptions   Pending Prescriptions Disp Refills     lisinopril-hydrochlorothiazide (PRINZIDE/ZESTORETIC) 20-25 MG tablet [Pharmacy Med Name: LISINOPRIL-HYDROCHLOROTHIAZIDE 20 MG-25MG TABLET] 90 tablet 3     Sig: Take 1 tablet by mouth daily    Diuretics (Including Combos) Protocol Failed - 12/10/2018 10:46 AM       Failed - Normal serum creatinine on file in past 12 months    Recent Labs   Lab Test 12/06/17  1621   CR 0.96          Failed - Normal serum potassium on file in past 12 months    Recent Labs   Lab Test 12/06/17  1621   POTASSIUM 3.7          Failed - Normal serum sodium on file in past 12 months    Recent Labs   Lab Test 12/06/17  1621   *          Passed - Blood pressure under 140/90 in past 12 months    BP Readings from Last 3 Encounters:   06/28/18 134/80   12/06/17 138/84   06/02/17 130/78          Passed - Recent (12 mo) or future (30 days) visit within the authorizing provider's specialty    Patient had office visit in the last 12 months or has a visit in the next 30 days with authorizing provider or within the authorizing provider's specialty.  See \"Patient Info\" tab in inO-RIDet, or \"Choose Columns\" in Meds & Orders section of the refill encounter.         Passed - Patient is age 18 or older        atorvastatin (LIPITOR) 10 MG tablet [Pharmacy Med Name: ATORVASTATIN CALCIUM 10 MG TABLET] 90 tablet 3     Sig: TAKE 1 TABLET (10 MG) BY MOUTH DAILY    Statins Protocol Passed - 12/10/2018 10:46 AM       Passed - LDL on file in past 12 months    Recent Labs   Lab Test 06/28/18  1606   LDL 53          Passed - No abnormal creatine kinase in past 12 months    No lab results found.        Passed - Recent (12 mo) or future (30 days) visit within the authorizing provider's specialty    Patient had office visit in the last 12 months or has a visit in the next 30 days with authorizing provider or within the authorizing provider's specialty.  See \"Patient Info\" tab in inO-RIDet, or \"Choose " "Columns\" in Meds & Orders section of the refill encounter.         Passed - Patient is age 18 or older        lisinopril-hydrochlorothiazide (PRINZIDE/ZESTORETIC) 20-25 MG per tablet  Medication is being filled for 1 time refill only due to:  Patient needs to be seen because due for 6 month OV 12/28/2018.    atorvastatin (LIPITOR) 10 MG tablet  Medication is being filled for 1 time refill only due to:  Patient needs to be seen because due for 6 month OV 12/28/2018.    Please assist with scheduling.    Rena Vega, RN, BSN           "

## 2018-12-14 NOTE — PROGRESS NOTES
"  SUBJECTIVE:   Taiwo Escobar is a 53 year old male who presents to clinic today for the following health issues:      History of Present Illness     Diabetes:     Frequency of checking blood sugars::  Weekly (\"mornings 130-140s, during the day 110-115's\")    Diabetic concerns::  None    Hypoglycemia symptoms::  None    Paraesthesia present::  No    Eye Exam in the last year::  Yes    11/24/2018    Diabetes Management Resources    Hyperlipidemia:     Low fat/chol diet rating::  Fair    Taking Statins::  No    Lipid Medications or Supplements::  None    Hypertension:     Outpatient blood pressures:  Are being checked    Blood pressures checked at:  Home and Work    Dietary sodium intake::  No added salt diet    Diet:  Low salt and Diabetic  Frequency of exercise:  4-5 days/week  Duration of exercise:  30-45 minutes  Taking medications regularly:  Yes  Medication side effects:  None  Additional concerns today:  No      Diabetes continues to be well controlled and he denies any new concerns.      Problem list and histories reviewed & adjusted, as indicated.  Additional history: none    ROS:  GENERAL: Denies fever, fatigue, weakness, weight gain, or weight loss.  CARDIOVASCULAR: Denies chest pain, shortness of breath, irregular heartbeats,  palpitations, or edema.  RESPIRATORY: Denies cough, hemoptysis, and shortness of breath.  NEUROLOGIC: Denies headache, fainting, dizziness, memory loss, numbness, tingling, or seizures.  PSYCHIATRIC: Denies depression, anxiety, mood swings, and thoughts of suicide.    OBJECTIVE:     /80   Pulse 66   Temp 97.4  F (36.3  C) (Temporal)   Resp 16   Wt 102.5 kg (226 lb)   SpO2 99%   BMI 31.99 kg/m    Body mass index is 31.99 kg/m .  GENERAL: healthy, alert and no distress  RESP: lungs clear to auscultation - no rales, rhonchi or wheezes  CV: regular rate and rhythm, normal S1 S2, no S3 or S4, no murmur, click or rub, no peripheral edema and peripheral pulses " strong  NEURO: Normal strength and tone, mentation intact and speech normal  PSYCH: mentation appears normal, affect normal/bright  Diabetic foot exam: normal DP and PT pulses, no trophic changes or ulcerative lesions, left great toe bunion, normal sensory exam and normal monofilament exam    Lab Results   Component Value Date    A1C 6.5 12/20/2018    A1C 6.3 06/28/2018    A1C 6.3 12/06/2017    A1C 6.2 06/02/2017    A1C 6.2 11/21/2016         ASSESSMENT/PLAN:       ICD-10-CM    1. Type 2 diabetes mellitus without complication, without long-term current use of insulin (H) E11.9 FOOT EXAM  NO CHARGE [82611.114]     BASIC METABOLIC PANEL     Albumin Random Urine Quantitative with Creat Ratio     TSH WITH FREE T4 REFLEX     metFORMIN (GLUCOPHAGE-XR) 500 MG 24 hr tablet     **A1C FUTURE 6mo   2. Hyperlipidemia LDL goal <100 E78.5 atorvastatin (LIPITOR) 10 MG tablet   3. Essential hypertension I10 BASIC METABOLIC PANEL     lisinopril-hydrochlorothiazide (PRINZIDE/ZESTORETIC) 20-25 MG tablet   4. Erectile dysfunction, unspecified erectile dysfunction type N52.9 sildenafil (REVATIO) 20 MG tablet       1. A1c is stable at 6.5 so will continue current dose of metformin along with routine healthy diet and routine exercise. Follow up in 6 months.    2. Stable, continue Lipitor.    3. Stable, continue Prinzide.    4. Revatio is working well for ED so will continue current dose.     Follow up in 6 months.     Moises Bell PA-C  [facility]

## 2018-12-20 ENCOUNTER — OFFICE VISIT (OUTPATIENT)
Dept: FAMILY MEDICINE | Facility: OTHER | Age: 53
End: 2018-12-20
Payer: COMMERCIAL

## 2018-12-20 VITALS
OXYGEN SATURATION: 99 % | SYSTOLIC BLOOD PRESSURE: 128 MMHG | RESPIRATION RATE: 16 BRPM | HEART RATE: 66 BPM | BODY MASS INDEX: 31.99 KG/M2 | TEMPERATURE: 97.4 F | WEIGHT: 226 LBS | DIASTOLIC BLOOD PRESSURE: 80 MMHG

## 2018-12-20 DIAGNOSIS — E11.9 TYPE 2 DIABETES MELLITUS WITHOUT COMPLICATION, WITHOUT LONG-TERM CURRENT USE OF INSULIN (H): ICD-10-CM

## 2018-12-20 DIAGNOSIS — N52.9 ERECTILE DYSFUNCTION, UNSPECIFIED ERECTILE DYSFUNCTION TYPE: ICD-10-CM

## 2018-12-20 DIAGNOSIS — E78.5 HYPERLIPIDEMIA LDL GOAL <100: ICD-10-CM

## 2018-12-20 DIAGNOSIS — I10 ESSENTIAL HYPERTENSION: ICD-10-CM

## 2018-12-20 LAB
ANION GAP SERPL CALCULATED.3IONS-SCNC: 9 MMOL/L (ref 3–14)
BUN SERPL-MCNC: 16 MG/DL (ref 7–30)
CALCIUM SERPL-MCNC: 9.1 MG/DL (ref 8.5–10.1)
CHLORIDE SERPL-SCNC: 101 MMOL/L (ref 94–109)
CO2 SERPL-SCNC: 26 MMOL/L (ref 20–32)
CREAT SERPL-MCNC: 1.21 MG/DL (ref 0.66–1.25)
CREAT UR-MCNC: 69 MG/DL
GFR SERPL CREATININE-BSD FRML MDRD: 67 ML/MIN/{1.73_M2}
GLUCOSE SERPL-MCNC: 129 MG/DL (ref 70–99)
HBA1C MFR BLD: 6.5 % (ref 0–5.6)
MICROALBUMIN UR-MCNC: 22 MG/L
MICROALBUMIN/CREAT UR: 31.78 MG/G CR (ref 0–17)
POTASSIUM SERPL-SCNC: 5 MMOL/L (ref 3.4–5.3)
SODIUM SERPL-SCNC: 136 MMOL/L (ref 133–144)
TSH SERPL DL<=0.005 MIU/L-ACNC: 1.07 MU/L (ref 0.4–4)

## 2018-12-20 PROCEDURE — 84443 ASSAY THYROID STIM HORMONE: CPT | Performed by: PHYSICIAN ASSISTANT

## 2018-12-20 PROCEDURE — 82043 UR ALBUMIN QUANTITATIVE: CPT | Performed by: PHYSICIAN ASSISTANT

## 2018-12-20 PROCEDURE — 80048 BASIC METABOLIC PNL TOTAL CA: CPT | Performed by: PHYSICIAN ASSISTANT

## 2018-12-20 PROCEDURE — 83036 HEMOGLOBIN GLYCOSYLATED A1C: CPT | Performed by: PHYSICIAN ASSISTANT

## 2018-12-20 PROCEDURE — 36415 COLL VENOUS BLD VENIPUNCTURE: CPT | Performed by: PHYSICIAN ASSISTANT

## 2018-12-20 PROCEDURE — 99214 OFFICE O/P EST MOD 30 MIN: CPT | Performed by: PHYSICIAN ASSISTANT

## 2018-12-20 PROCEDURE — 99207 C FOOT EXAM  NO CHARGE: CPT | Performed by: PHYSICIAN ASSISTANT

## 2018-12-20 RX ORDER — LISINOPRIL AND HYDROCHLOROTHIAZIDE 20; 25 MG/1; MG/1
1 TABLET ORAL DAILY
Qty: 90 TABLET | Refills: 3 | Status: SHIPPED | OUTPATIENT
Start: 2018-12-20 | End: 2020-01-08

## 2018-12-20 RX ORDER — ATORVASTATIN CALCIUM 10 MG/1
10 TABLET, FILM COATED ORAL DAILY
Qty: 90 TABLET | Refills: 3 | Status: SHIPPED | OUTPATIENT
Start: 2018-12-20 | End: 2020-01-08

## 2018-12-20 RX ORDER — SILDENAFIL CITRATE 20 MG/1
TABLET ORAL
Qty: 18 TABLET | Refills: 3 | Status: SHIPPED | OUTPATIENT
Start: 2018-12-20 | End: 2019-08-15

## 2018-12-20 RX ORDER — METFORMIN HCL 500 MG
TABLET, EXTENDED RELEASE 24 HR ORAL
Qty: 90 TABLET | Refills: 1 | Status: SHIPPED | OUTPATIENT
Start: 2018-12-20 | End: 2019-07-10

## 2018-12-24 ENCOUNTER — TELEPHONE (OUTPATIENT)
Dept: FAMILY MEDICINE | Facility: OTHER | Age: 53
End: 2018-12-24

## 2018-12-24 DIAGNOSIS — N52.9 ERECTILE DYSFUNCTION, UNSPECIFIED ERECTILE DYSFUNCTION TYPE: Primary | ICD-10-CM

## 2018-12-24 NOTE — TELEPHONE ENCOUNTER
PRIOR AUTHORIZATION DENIED    Medication: sildenafil (REVATIO) 20 MG tablet    Denial Date: 12/24/2018    Denial Rational:      Appeal Information:    If you would like to appeal, please supply P/A team with a letter of medical necessity with clinical reason.

## 2018-12-24 NOTE — LETTER
[address]   [facility] [address]  Phone: [phone]    December 26, 2018        Taiwo Escobar  [address]  [facility] [address]          To whom it may concern:    RE: Taiwo Escobar    I am the primary care provider for Mr. Taiwo Escobar. He has a history of type 2 diabetes and erectile dysfunction. Despite great diabetic control, he continues to suffer from erectile dysfunction and sildenafil 20 mg daily as needed has worked very well for him for the past two years to control his symptoms. It is my professional medical opinion that he continue this medication. Thank you.     Please contact me for questions or concerns.      Sincerely,        Moises Bell PA-C

## 2018-12-24 NOTE — TELEPHONE ENCOUNTER
Central Prior Authorization Team   Phone: [phone]      PA Initiation    Medication: sildenafil (REVATIO) 20 MG tablet  Insurance Company: [employer] ([facility]) - Phone [phone] Fax [phone]  Pharmacy Filling the Rx: [facility] [place] [address]  Filling Pharmacy Phone: [phone]  Filling Pharmacy Fax: [phone]  Start Date: 12/24/2018

## 2018-12-24 NOTE — TELEPHONE ENCOUNTER
Prior Authorization Retail Medication Request    Medication/Dose: sildenafil (REVATIO) 20 MG tablet  ICD code (if different than what is on RX):    Previously Tried and Failed:    Rationale:      Insurance Name:    Insurance ID:        Pharmacy Information (if different than what is on RX)  Name:    Phone:

## 2018-12-26 NOTE — TELEPHONE ENCOUNTER
Medication Appeal Initiation    We have initiated an appeal for the requested medication:  Medication: sildenafil (REVATIO) 20 MG tablet  Appeal Start Date:  12/26/2018  Insurance Company: [employer] ([facility]) - Phone [phone] Fax [phone]  Comments:

## 2018-12-27 NOTE — TELEPHONE ENCOUNTER
Please notify patient this was denied and see if he wants to go through our pharmacy to see if they have any coupons for Viagra or Cialis.    Moises Bell PA-C

## 2018-12-27 NOTE — TELEPHONE ENCOUNTER
Peg from [facility] called-the appeal has been denied, a 2nd level appeal can be done, the information has been mailed to the clinic.  Any questions please call [phone] Pharmacy Case ID [id number]

## 2018-12-28 RX ORDER — TADALAFIL 20 MG/1
TABLET ORAL
Qty: 12 TABLET | Refills: 5 | Status: SHIPPED | OUTPATIENT
Start: 2018-12-28 | End: 2019-08-15

## 2018-12-28 RX ORDER — SILDENAFIL 50 MG/1
TABLET, FILM COATED ORAL
Qty: 12 TABLET | Refills: 5 | Status: SHIPPED | OUTPATIENT
Start: 2018-12-28 | End: 2019-08-15

## 2018-12-28 NOTE — TELEPHONE ENCOUNTER
I sent over Cialis and Viagra to our pharmacy so they can see which one they have a coupon for. He can call the pharmacy to check if he would like.     Moises Bell PA-C

## 2018-12-28 NOTE — TELEPHONE ENCOUNTER
Patient stated that he can use our pharmacy but also wants to know if he should call [facility] to see why it was denied? For this, does he need to call our pharmacy first or does an Rx need to be sent over. Told patient we will be calling back with an answer. Routing to ERIC Garrett MA

## 2019-01-24 NOTE — PROGRESS NOTES
"  SUBJECTIVE:   Taiwo Escobar is a 53 year old male who presents to clinic today for the following health issues:      History of Present Illness     Diet:  Low salt  Frequency of exercise:  4-5 days/week  Duration of exercise:  30-45 minutes  Taking medications regularly:  Yes  Medication side effects:  None  Additional concerns today:  No    Left  Heel     Onset: Has been sore for the past 2 months. Pain has worsened the last 5 days.     Description:   Location: left ankle   Character:  Constant Sharp pain with movement     Intensity: 10/10    Progression of Symptoms: worse    Accompanying Signs & Symptoms:  Other symptoms: Very stiff in the mornings, worsens as the day goes on , no numbness, no tingling ,   YES swelling.      History:   Previous similar pain: no       Precipitating factors:   Trauma or overuse: no     Alleviating factors:  Improved by: nothing    Therapies Tried and outcome:  Heating pad, icing , elevating,  And Tylenol.     Patient is here in clinic with pain in the left achilles tendon. He has not had any specific injury to the area. He reports that the area just above the heel began to be a little sore for a few months but since Monday he has had a significant increase in pain and mild swelling to the area. He has not been on any new medications or supplements. He has tried ibuprofen, ice,heat and a wrap but has not had improvement in symptoms.     Problem list and histories reviewed & adjusted, as indicated.  Additional history: as documented    BP Readings from Last 3 Encounters:   01/25/19 116/78   12/20/18 128/80   06/28/18 134/80    Wt Readings from Last 3 Encounters:   01/25/19 102.2 kg (225 lb 6.4 oz)   12/20/18 102.5 kg (226 lb)   06/28/18 102.8 kg (226 lb 9.6 oz)                    ROS:  No other associated symptoms     OBJECTIVE:     /78   Pulse 70   Temp 98.2  F (36.8  C) (Temporal)   Resp 14   Ht 1.816 m (5' 11.5\")   Wt 102.2 kg (225 lb 6.4 oz)   SpO2 99%   BMI " 31.00 kg/m    Body mass index is 31 kg/m .  GENERAL: healthy, alert and no distress  NECK: no adenopathy, no asymmetry, masses, or scars and thyroid normal to palpation  RESP: lungs clear to auscultation - no rales, rhonchi or wheezes  CV: regular rates and rhythm  MS: mild swelling and tenderness to the base of the achilles tendon, no bony tenderness, no redness to the area. Patient has no pain with foot extension but flexion of the foot is limited due to pain.   SKIN: no suspicious lesions or rashes    Diagnostic Test Results:  none     ASSESSMENT/PLAN:       ICD-10-CM    1. Pain in Achilles tendon M76.60 PODIATRY/FOOT & ANKLE SURGERY REFERRAL       I will have patient follow up with podiatry for further evaluation/treatment of achilles tendon injury. He can continue ibuprofen and rest to the area.   See Patient Instructions    Archana Lorenzo PA-C  [facility]

## 2019-01-25 ENCOUNTER — OFFICE VISIT (OUTPATIENT)
Dept: FAMILY MEDICINE | Facility: CLINIC | Age: 54
End: 2019-01-25
Payer: COMMERCIAL

## 2019-01-25 VITALS
OXYGEN SATURATION: 99 % | SYSTOLIC BLOOD PRESSURE: 116 MMHG | BODY MASS INDEX: 30.53 KG/M2 | TEMPERATURE: 98.2 F | RESPIRATION RATE: 14 BRPM | DIASTOLIC BLOOD PRESSURE: 78 MMHG | WEIGHT: 225.4 LBS | HEART RATE: 70 BPM | HEIGHT: 72 IN

## 2019-01-25 DIAGNOSIS — M76.60 PAIN IN ACHILLES TENDON: Primary | ICD-10-CM

## 2019-01-25 PROCEDURE — 99214 OFFICE O/P EST MOD 30 MIN: CPT | Performed by: PHYSICIAN ASSISTANT

## 2019-01-25 ASSESSMENT — PAIN SCALES - GENERAL: PAINLEVEL: WORST PAIN (10)

## 2019-01-25 ASSESSMENT — MIFFLIN-ST. JEOR: SCORE: 1887.47

## 2019-01-28 ENCOUNTER — ANCILLARY PROCEDURE (OUTPATIENT)
Dept: GENERAL RADIOLOGY | Facility: CLINIC | Age: 54
End: 2019-01-28
Payer: COMMERCIAL

## 2019-01-28 ENCOUNTER — OFFICE VISIT (OUTPATIENT)
Dept: PODIATRY | Facility: CLINIC | Age: 54
End: 2019-01-28
Payer: COMMERCIAL

## 2019-01-28 VITALS
BODY MASS INDEX: 30.53 KG/M2 | HEIGHT: 72 IN | DIASTOLIC BLOOD PRESSURE: 80 MMHG | WEIGHT: 225.4 LBS | SYSTOLIC BLOOD PRESSURE: 118 MMHG

## 2019-01-28 DIAGNOSIS — M20.5X2 HALLUX LIMITUS OF LEFT FOOT: ICD-10-CM

## 2019-01-28 DIAGNOSIS — M76.62 ACHILLES TENDINITIS OF LEFT LOWER EXTREMITY: Primary | ICD-10-CM

## 2019-01-28 DIAGNOSIS — M77.32 HEEL SPUR, LEFT: ICD-10-CM

## 2019-01-28 PROCEDURE — 73630 X-RAY EXAM OF FOOT: CPT | Mod: TC

## 2019-01-28 PROCEDURE — 99243 OFF/OP CNSLTJ NEW/EST LOW 30: CPT | Performed by: PODIATRIST

## 2019-01-28 RX ORDER — DICLOFENAC SODIUM 75 MG/1
75 TABLET, DELAYED RELEASE ORAL 2 TIMES DAILY
Qty: 60 TABLET | Refills: 1 | Status: SHIPPED | OUTPATIENT
Start: 2019-01-28 | End: 2019-08-15

## 2019-01-28 RX ORDER — NAPROXEN SODIUM 220 MG
220 TABLET ORAL PRN
COMMUNITY
End: 2019-08-15

## 2019-01-28 ASSESSMENT — MIFFLIN-ST. JEOR: SCORE: 1887.47

## 2019-01-28 ASSESSMENT — PAIN SCALES - GENERAL: PAINLEVEL: WORST PAIN (10)

## 2019-01-28 NOTE — LETTER
[address] [address]  [phone]    2019      RE:  Taiwo Escobar  : 1965      To whom it may concern:    This patient may return to work light duty in a fracture boot Weight Bearing to tolerance in the boot.  Follow up in about 4 weeks.      Sincerely,          Bolivar Perez DPM

## 2019-01-28 NOTE — LETTER
1/28/2019         RE: Taiwo Escobar  [address]  [facility] [address]        Dear Colleague,    Thank you for referring your patient, Taiwo Escobar, to the [facility]. Please see a copy of my visit note below.    HPI:  Taiwo Escobar is a 53 year old male who is seen in consultation at the request of Archana Lorenzo PA-C.    Pt presents for eval of:   (Onset, Location, L/R, Character, Treatments, Injury if yes)    1. DM Type 2     2. Onset 1/21/2019, posterior Left achilles pain. No injury noted. Presents today with athletic shoes and 1 crutch.  Constant, lump,  Intermittent, sharp, stabbing, throbbing, worse w/first steps after sitting or lying or certain ROM, pain 2-10  Rest helps, aleve    Works for a [employer] as an , desk job.    Weight management plan: Patient was referred to their PCP to discuss a diet and exercise plan.     Patient to follow up with Primary Care provider regarding elevated blood pressure.    ROS:  10 point ROS neg other than the symptoms noted above in the HPI.    Patient Active Problem List   Diagnosis     Obesity     Type 2 diabetes mellitus without complication (H)     Essential hypertension     Hyperlipidemia LDL goal <100       PAST MEDICAL HISTORY:   Past Medical History:   Diagnosis Date     Diabetes (H)      HTN (hypertension)         PAST SURGICAL HISTORY:   Past Surgical History:   Procedure Laterality Date     COLONOSCOPY N/A 4/15/2016    Procedure: COLONOSCOPY;  Surgeon: Thanh Martin MD;  Location: [facility]     NO HISTORY OF SURGERY          MEDICATIONS:   Current Outpatient Medications:      aspirin 81 MG tablet, Take 1 tablet (81 mg) by mouth daily, Disp: 90 tablet, Rfl: 3     atorvastatin (LIPITOR) 10 MG tablet, Take 1 tablet (10 mg) by mouth daily, Disp: 90 tablet, Rfl: 3     diclofenac (VOLTAREN) 75 MG EC tablet, Take 1 tablet (75 mg) by mouth 2 times daily, Disp: 60 tablet, Rfl: 1      lisinopril-hydrochlorothiazide (PRINZIDE/ZESTORETIC) 20-25 MG tablet, Take 1 tablet by mouth daily, Disp: 90 tablet, Rfl: 3     metFORMIN (GLUCOPHAGE-XR) 500 MG 24 hr tablet, TAKE 1 TABLET (500 MG) BY MOUTH DAILY, Disp: 90 tablet, Rfl: 1     naproxen sodium (ALEVE) 220 MG tablet, Take 220 mg by mouth as needed for moderate pain, Disp: , Rfl:      sildenafil (REVATIO) 20 MG tablet, 1-2 tablets 30 min to 4 hours before intercourse., Disp: 18 tablet, Rfl: 3     sildenafil (VIAGRA) 50 MG tablet, Take 0.5-1 tablets 30 minutes to 4 hours prior to intercourse, Disp: 12 tablet, Rfl: 5     tadalafil (CIALIS) 20 MG tablet, Take 0.5-1 tablets 30 minutes to 4 hours prior to intercourse, Disp: 12 tablet, Rfl: 5     ALLERGIES:    Allergies   Allergen Reactions     No Known Drug Allergies         SOCIAL HISTORY:   Social History     Socioeconomic History     Marital status:      Spouse name: Not on file     Number of children: Not on file     Years of education: Not on file     Highest education level: Not on file   Social Needs     Financial resource strain: Not on file     Food insecurity - worry: Not on file     Food insecurity - inability: Not on file     Transportation needs - medical: Not on file     Transportation needs - non-medical: Not on file   Occupational History     Not on file   Tobacco Use     Smoking status: Never Smoker     Smokeless tobacco: Never Used   Substance and Sexual Activity     Alcohol use: Yes     Comment: a few days per week     Drug use: No     Sexual activity: Yes     Partners: Female   Other Topics Concern     Parent/sibling w/ CABG, MI or angioplasty before 65F 55M? Not Asked   Social History Narrative     Not on file        FAMILY HISTORY:   Family History   Problem Relation Age of Onset     C.A.D. Mother         stents, MI age 62     Lipids Mother      Hypertension No family hx of      Diabetes No family hx of         EXAM:Vitals: /80 (BP Location: Left arm, Cuff Size: Adult Large)  "  Ht 1.816 m (5' 11.5\")   Wt 102.2 kg (225 lb 6.4 oz)   BMI 31.00 kg/m     BMI= Body mass index is 31 kg/m .    General appearance: Patient is alert and fully cooperative with history & exam.  No sign of distress is noted during the visit.     Psychiatric: Affect is pleasant & appropriate.  Patient appears motivated to improve health.     Respiratory: Breathing is regular & unlabored while sitting.     HEENT: Hearing is intact to spoken word.  Speech is clear.  No gross evidence of visual impairment that would impact ambulation.     Vascular: DP & PT pulses are intact & regular bilaterally.  No significant edema or varicosities noted.  CFT and skin temperature is normal to both lower extremities.     Neurologic: Lower extremity sensation is intact to light touch.  No evidence of weakness or contracture in the lower extremities.  No evidence of neuropathy.    Dermatologic: Skin is intact to both lower extremities with adequate texture, turgor and tone about the integument.  No paronychia or evidence of soft tissue infection is noted.     Musculoskeletal: Patient is ambulatory without assistive device or brace.  There is gross prominence about the posterior left calcaneus.  Pain with firm palpation at the insertion of the left Achilles tendon.  There is palpable induration.  Subtle erythema but no heat.  Manual muscle strength +5/5 to all 4 quadrants however he does have discomfort with muscle strength testing of the left Achilles tendon.  Relatively high arched foot so low-volume of motion available total throughout the ankle and rear foot however quality of motion is adequate and pain-free with the exception of the left Achilles tendon.  There is also limited range of motion about the left first MPJ and hallux IPJ.    Radiographs: 3 views left foot demonstrate insertional calcific tendinosis at the Achilles tendon insertion of the left foot.  Also mild degenerative changes about the left first MPJ and hallux " IPJ.    Hemoglobin A1C (%)   Date Value   12/20/2018 6.5 (H)   06/28/2018 6.3 (H)   12/06/2017 6.3 (H)   06/02/2017 6.2 (H)   11/21/2016 6.2 (H)   11/17/2015 6.1 (H)   04/29/2015 6.3 (H)   02/19/2015 6.1 (H)     Creatinine (mg/dL)   Date Value   12/20/2018 1.21   12/06/2017 0.96   06/02/2017 1.26 (H)   11/21/2016 1.36 (H)   11/17/2015 1.10   02/19/2015 0.85        ASSESSMENT:       ICD-10-CM    1. Achilles tendinitis of left lower extremity M76.62 XR Foot Left G/E 3 Views     diclofenac (VOLTAREN) 75 MG EC tablet   2. Heel spur, left M77.32 XR Foot Left G/E 3 Views     diclofenac (VOLTAREN) 75 MG EC tablet   3. Hallux limitus of left foot M20.5X2         PLAN:  Reviewed patient's chart in [facility].      1/28/2019   Fracture boot 24/7 until pain free times 10 days then progress out of the fracture boot for weightbearing activities but keep it in place during sleep for night splint.  Weightbearing to tolerance in the fracture boot  voltaren to reduce inflammation.  Discussed potential risks and complications.  Follow up 4 weeks  letter for light duty work in a fracture boot weightbearing as tolerated  Obtained and interpreted radiographs  Discussed alternative treatment options and if this does not become asymptomatic after a course of conservative options we may consider detach reattach of the Achilles tendon left foot.      Bolivar Perez DPM      Again, thank you for allowing me to participate in the care of your patient.        Sincerely,        Bolivar Perez DPM

## 2019-01-28 NOTE — PATIENT INSTRUCTIONS
"DIABETES AND YOUR FEET    What effect does diabetes have on the feet?  Diabetes can result in several problems in the feet including contractures of the tendons leading to deformities and reduced function of the bones, skin ulcers or open sores on pressure points or prominent deformities, reduced sensation, reduced blood flow and thus reduced oxygen and immune cells to the tiny vessels in our feet. This all leads to higher risk of hospitalization, infections, and amputations.     What is neuropathy?  Neuropathy is a term used to describe a loss of nerve function.  Patients with diabetes are at risk of developing neuropathy if their sugars continue to run high and are above the normal value of 140.  The elevated blood sugar in the body enters the nerves causing it to swell and impair nerve function.  The higher the blood sugar and the longer it is elevated, the more damage is done to nerves.  This damage is permanent and irreversible.  These damaged sensory nerves can then cause reduced feeling or cause pain.  Damaged motor nerves can reduce blood flow and white blood cells into into your foot, skin and bones reducing your ability to heal a small problem. And neuropathy can cause tendons to become unbalanced and contribute to the formation of deformity and contractures in our feet. Often times, neuropathy can be prevented by controlling your blood sugar.  Your risk of developing neuropathy goes up dramatically as your hemoglobin A1C raises above 7.5.      How do I know if I have neuropathy?  When a person develops neuropathy, they usually begin to feel numbness or tingling in their feet and sometimes in their legs.  Other symptoms may include painful burning or hot feet, tingling, electrical sensations or feeling like insects or ants are crawling on your feet or legs.  If blood sugar remains above 140  for long periods of time, neuropathy can also occur in the hands.  When a person loses their \"protective threshold\" " or ability to detect a 5.[address] [employer] monofilament is when they have elevated risk for developing foot deformity, contractures, foot infections, amputations, Charcot arthropathy, or other complications. Keep your hemoglobin A1C below 7.5 to reduce this risk.    What is vascular disease?  Peripheral vascular disease is a term used to describe a loss or decrease in circulation (blood flow).  There is a problem in getting blood, immune cells, and oxygen to areas that need it.  Similar to neuropathy, sugars can build up in the walls of the arteries (blood vessels) and cause them to become swollen, thickened and hardened.  This decreases the amount of blood that can go to an area that needs it.  Though this is common in the legs of diabetic patients, it can also affect other arteries (blood vessels) in the body such as in the heart, kidney, eyes, and the blood flow into bones.  It is often seen first in the small vessels of her body notably our feet and toes.    How do I know if I have vascular disease?  In the legs, vascular disease usually results in cramping.  Patients who develop leg cramps after walking the same distance every time (i.e. One block, half a mile, ect.) need to let their doctors know so that their circulation may be checked.  Cramps causing severe pain in the feet and/or legs while sleeping and the cramps go away when you stand or hang your legs off the side of the bed, may also be a sign of poor blood circulation.  Occasional cramping in cold weather or on rare occasions with activity may not be due to poor circulation, but you should inform your doctor.    How can these problems be prevented?  The key to prevention is good blood sugar control all day every day.  Inadequate blood sugar control is the most common way patients experience these problems. Reducing, controlling and measuring your daily consumption of sugar or carbohydrates is essential to understanding and managing diabetes.   Physical activity (exercise) is a very good way to help decrease your blood sugars.  Exercise can lower your blood sugar, blood pressure, and cholesterol.  It also reduces your risk for heart disease and stroke, relieves stress, and strengthens your heart, muscles and bones. Physical activity also increases your balance and reduces development of contractures and foot deformities over time. In addition, regular activity helps insulin work better, improves your blood circulation, and keeps your joints flexible.  If you're trying to lose weight, a combination of exercise and wise food choices can help you reach your target weight and maintain it.  Activity and exercise alone can not make up for poor diet choices, eating too much, or eating too many sugars or carbohydrates.  Ask your doctor for help when you are not meeting your blood sugar goals. Changes or increases in medication are powerful tools in reducing your blood sugar.    Know your blood sugar and hemoglobin A1C trend.  Upon first diagnosis or during acute illness, checking your blood sugar 4 times a day can help you understand how your diet, activity, and lifestyle affect your blood sugar.  Monitoring your hemoglobin A1C can help you understand how well you are managing blood sugar over the long run.  Your hemoglobin A1C tells you what your blood sugar averages all day, every day, over the past 90 days.       To experience the lowest risk of complications associated with diabetes such as neuropathy, loss of blood flow, bone or joint infection, charcot arthropathy, or amputation, the American Diabetes Association recommends a target hemoglobin A1C of less than 7.0%, while the American Association of Clinical Endocrinologists' recommendation is 6.5% or less.  Both organizations advise that the goals be individualized based on patient factors such as other health conditions, history of hypoglycemia, education, and life expectancy.  A patients risk of  experiencing complications associated with diabetes is only slightly elevated with a hemoglobin A1C above 6.0.  However, this risk goes up exponentially when the hemoglobin A1C is above 7.5.  The longer the hemoglobin A1C is elevated, the more risk that patient will experience in their lifetime. The damage that occurs to nerves, blood vessels, tendons, bones and body organs, while their hemoglobin A1C is elevated is mostly irreversible and worsens with each additional time period of elevated hemoglobin A1C.     You must understand and manage your disease.  Your health insurance or medical team cannot manage this disease for you.  When you take responsibility for understanding and managing your disease, you can expect to experience fewer problems associated with diabetes in your lifetime.  You will  Also experience a higher quality of life and health and reduced cost of health care.    Diabetic Foot Care Recommendations  The following are recommendations for avoiding serious foot problems or injury    DO'S  1. Be aware of your hemoglobin A1C and continue to follow up with your medical team for adjustments in your lifestyle and medication until your reach your A1C goal.  Keep this below 7.5 to reduce your risk of developing complications associated with diabetes.    2.  Wash your feet with lukewarm water and a mild soap and then dry them thoroughly, especially between the toes.  Gently floss your towel or washcloth between each toe at every bath.  Soaking your feet in water cannot clean dead skin, debris, and bacteria from your feet and is not necessary.   3. Examine your feet daily looking for cuts, corns, blisters, cracks, ect..., especially after wearing new shoes or increased or changed activities.  Make sure to look between your toes.  If you cannot see the bottom of your feet, set a mirror on the floor and hold your foot over it, or ask a family member to examine your feet for you daily.  Contact your doctor  immediately if new problems are noted or if sores are not healing.  4.  Immediately apply moisturizer cream such as Cetaphil to the tops and bottoms of your feet, avoiding areas between the toes.  Apply sunscreen or cover your feet if they will be exposed to extended sunlight.  5.Use clean comfortable shoes.  Socks should not have thick seams or cut off the circulation around the leg.  Break in new shoes slowly and rotate with older shoes until broken in.  Check the inside of your shoes with your hand to look for areas of irritation or objects that may have fallen into your shoes.    6. Keep slippers by the side of your bed for use during the night.  7. Shoes should be fitted by a professional and should not cause areas of irritation.  Check your feet regularly when wearing a new pair of shoes and replace them as needed.  8.  Talk to your doctor about proper exercise.  Exercise and stretching stimulate blood flow to your feet and maintain proper glucose levels.  Use it or lose it!  9.  Monitor your blood glucose level and your hemoglobin A1C.  Notify your doctor immediately if your blood sugar is abnormally high or low.  10.  Cut your nails straight across, but then gently round any sharp edges with a nail file.  If you have neuropathy, peripheral vascular disease or cannot see that well to trim your own toenails, see a medical professional for care.    DONT'S  1.  Do not soak your feet if you have an open sore or your provider has informed you that you have neuropathy or loss of protective threshold.  Use only lukewarm water and always check the temperature with your hand as hot water can easily burn your feet.    2.  Never use a hot water bottle or heating pad on your feet.  Also do not apply hot or cold compresses to your feet.  With decreased sensation, you could burn or freeze your feet.  Do not rest your feet near a heat source such as a heater or heat register.    3.  Do not apply any of these to your  feet:    - over the counter medicine for corns or warts    -  Harsh chemicals like boric acid    -  Do not self-treat corns, cuts, blisters or infections.  Always consult your doctor.   4.  Do not wear sandals, slippers or walk barefoot, especially on harsh surfaces.  5.  If you smoke, stop!!!    Reliable shoe stores: To maximize your experience and provide the best possible fit.  Be sure to show them your foot concerns and tell them Dr. Perez sent you.      Stores listed in bold have only athletic shoes, and stores that are not bold are mostly casual or variety of shoes    [employer]  [address] [place] [address]  [phone]     [employer] [facility]  [address] [place] [address]  [phone]    [facility] [employer] [facility]  [address] [place] [address]  [phone]    [employer]  [address] [facility] [address]  [phone]    [employer]  [address] [place] [address]  [phone]    [employer]  [address] [place] [address]  [phone]                         [employer] Locations:     [address] [place] [address]   [phone]     [address] [place] [address] [address] [place] [address]   [phone]     [address] [place] [address]   [phone]     [address] [place] [address]   [phone]     [address] [place] [address]   [phone]     [address] [facility] [place] [address]   [phone]     [address] [facility] [address]   [place] [address]   [phone]     [address]   [place], [place] [id number] [phone]             How to find reasonable shoes          The correct width    Correct Fitting    Correct Length      Foot Distortion    Posture Distortion                          Torsional Rigidity      Grasp behind the heel and underneath the foot and twist      Bad    Excessive torsion/twist in midfoot     Less torsion/twist in  midfoot is better                   Heel Counter Rigidity      Grasp just above   midsole and squeeze      Bad    Soft heel counter      Good    Rigid Heel Counter      Flexion Rigidity      Grasp shoe and bend from forefoot to rearfoot

## 2019-01-28 NOTE — NURSING NOTE
Dispensed 1 Pneumatic Walking Brace, Size Large, with [facility] agreement signed by patient. Marta Chin CMA, January 28, 2019

## 2019-01-28 NOTE — PROGRESS NOTES
HPI:  Taiwo Escobar is a 53 year old male who is seen in consultation at the request of Archana Lorenzo PA-C.    Pt presents for eval of:   (Onset, Location, L/R, Character, Treatments, Injury if yes)    1. DM Type 2     2. Onset 1/21/2019, posterior Left achilles pain. No injury noted. Presents today with athletic shoes and 1 crutch.  Constant, lump,  Intermittent, sharp, stabbing, throbbing, worse w/first steps after sitting or lying or certain ROM, pain 2-10  Rest helps, aleve    Works for a [employer] as an , desk job.    Weight management plan: Patient was referred to their PCP to discuss a diet and exercise plan.     Patient to follow up with Primary Care provider regarding elevated blood pressure.    ROS:  10 point ROS neg other than the symptoms noted above in the HPI.    Patient Active Problem List   Diagnosis     Obesity     Type 2 diabetes mellitus without complication (H)     Essential hypertension     Hyperlipidemia LDL goal <100       PAST MEDICAL HISTORY:   Past Medical History:   Diagnosis Date     Diabetes (H)      HTN (hypertension)         PAST SURGICAL HISTORY:   Past Surgical History:   Procedure Laterality Date     COLONOSCOPY N/A 4/15/2016    Procedure: COLONOSCOPY;  Surgeon: Thanh Martin MD;  Location: [facility]     NO HISTORY OF SURGERY          MEDICATIONS:   Current Outpatient Medications:      aspirin 81 MG tablet, Take 1 tablet (81 mg) by mouth daily, Disp: 90 tablet, Rfl: 3     atorvastatin (LIPITOR) 10 MG tablet, Take 1 tablet (10 mg) by mouth daily, Disp: 90 tablet, Rfl: 3     diclofenac (VOLTAREN) 75 MG EC tablet, Take 1 tablet (75 mg) by mouth 2 times daily, Disp: 60 tablet, Rfl: 1     lisinopril-hydrochlorothiazide (PRINZIDE/ZESTORETIC) 20-25 MG tablet, Take 1 tablet by mouth daily, Disp: 90 tablet, Rfl: 3     metFORMIN (GLUCOPHAGE-XR) 500 MG 24 hr tablet, TAKE 1 TABLET (500 MG) BY MOUTH DAILY, Disp: 90 tablet, Rfl: 1     naproxen sodium (ALEVE) 220 MG  "tablet, Take 220 mg by mouth as needed for moderate pain, Disp: , Rfl:      sildenafil (REVATIO) 20 MG tablet, 1-2 tablets 30 min to 4 hours before intercourse., Disp: 18 tablet, Rfl: 3     sildenafil (VIAGRA) 50 MG tablet, Take 0.5-1 tablets 30 minutes to 4 hours prior to intercourse, Disp: 12 tablet, Rfl: 5     tadalafil (CIALIS) 20 MG tablet, Take 0.5-1 tablets 30 minutes to 4 hours prior to intercourse, Disp: 12 tablet, Rfl: 5     ALLERGIES:    Allergies   Allergen Reactions     No Known Drug Allergies         SOCIAL HISTORY:   Social History     Socioeconomic History     Marital status:      Spouse name: Not on file     Number of children: Not on file     Years of education: Not on file     Highest education level: Not on file   Social Needs     Financial resource strain: Not on file     Food insecurity - worry: Not on file     Food insecurity - inability: Not on file     Transportation needs - medical: Not on file     Transportation needs - non-medical: Not on file   Occupational History     Not on file   Tobacco Use     Smoking status: Never Smoker     Smokeless tobacco: Never Used   Substance and Sexual Activity     Alcohol use: Yes     Comment: a few days per week     Drug use: No     Sexual activity: Yes     Partners: Female   Other Topics Concern     Parent/sibling w/ CABG, MI or angioplasty before 65F 55M? Not Asked   Social History Narrative     Not on file        FAMILY HISTORY:   Family History   Problem Relation Age of Onset     C.A.D. Mother         stents, MI age 62     Lipids Mother      Hypertension No family hx of      Diabetes No family hx of         EXAM:Vitals: /80 (BP Location: Left arm, Cuff Size: Adult Large)   Ht 1.816 m (5' 11.5\")   Wt 102.2 kg (225 lb 6.4 oz)   BMI 31.00 kg/m    BMI= Body mass index is 31 kg/m .    General appearance: Patient is alert and fully cooperative with history & exam.  No sign of distress is noted during the visit.     Psychiatric: Affect is " pleasant & appropriate.  Patient appears motivated to improve health.     Respiratory: Breathing is regular & unlabored while sitting.     HEENT: Hearing is intact to spoken word.  Speech is clear.  No gross evidence of visual impairment that would impact ambulation.     Vascular: DP & PT pulses are intact & regular bilaterally.  No significant edema or varicosities noted.  CFT and skin temperature is normal to both lower extremities.     Neurologic: Lower extremity sensation is intact to light touch.  No evidence of weakness or contracture in the lower extremities.  No evidence of neuropathy.    Dermatologic: Skin is intact to both lower extremities with adequate texture, turgor and tone about the integument.  No paronychia or evidence of soft tissue infection is noted.     Musculoskeletal: Patient is ambulatory without assistive device or brace.  There is gross prominence about the posterior left calcaneus.  Pain with firm palpation at the insertion of the left Achilles tendon.  There is palpable induration.  Subtle erythema but no heat.  Manual muscle strength +5/5 to all 4 quadrants however he does have discomfort with muscle strength testing of the left Achilles tendon.  Relatively high arched foot so low-volume of motion available total throughout the ankle and rear foot however quality of motion is adequate and pain-free with the exception of the left Achilles tendon.  There is also limited range of motion about the left first MPJ and hallux IPJ.    Radiographs: 3 views left foot demonstrate insertional calcific tendinosis at the Achilles tendon insertion of the left foot.  Also mild degenerative changes about the left first MPJ and hallux IPJ.    Hemoglobin A1C (%)   Date Value   12/20/2018 6.5 (H)   06/28/2018 6.3 (H)   12/06/2017 6.3 (H)   06/02/2017 6.2 (H)   11/21/2016 6.2 (H)   11/17/2015 6.1 (H)   04/29/2015 6.3 (H)   02/19/2015 6.1 (H)     Creatinine (mg/dL)   Date Value   12/20/2018 1.21   12/06/2017  0.96   06/02/2017 1.26 (H)   11/21/2016 1.36 (H)   11/17/2015 1.10   02/19/2015 0.85        ASSESSMENT:       ICD-10-CM    1. Achilles tendinitis of left lower extremity M76.62 XR Foot Left G/E 3 Views     diclofenac (VOLTAREN) 75 MG EC tablet   2. Heel spur, left M77.32 XR Foot Left G/E 3 Views     diclofenac (VOLTAREN) 75 MG EC tablet   3. Hallux limitus of left foot M20.5X2         PLAN:  Reviewed patient's chart in [facility].      1/28/2019   Fracture boot 24/7 until pain free times 10 days then progress out of the fracture boot for weightbearing activities but keep it in place during sleep for night splint.  Weightbearing to tolerance in the fracture boot  voltaren to reduce inflammation.  Discussed potential risks and complications.  Follow up 4 weeks  letter for light duty work in a fracture boot weightbearing as tolerated  Obtained and interpreted radiographs  Discussed alternative treatment options and if this does not become asymptomatic after a course of conservative options we may consider detach reattach of the Achilles tendon left foot.      Bolivar Perez DPM

## 2019-02-26 ENCOUNTER — OFFICE VISIT (OUTPATIENT)
Dept: PODIATRY | Facility: OTHER | Age: 54
End: 2019-02-26
Payer: COMMERCIAL

## 2019-02-26 VITALS
SYSTOLIC BLOOD PRESSURE: 136 MMHG | WEIGHT: 225.4 LBS | DIASTOLIC BLOOD PRESSURE: 80 MMHG | BODY MASS INDEX: 30.53 KG/M2 | HEIGHT: 72 IN

## 2019-02-26 DIAGNOSIS — M76.62 ACHILLES TENDINITIS OF LEFT LOWER EXTREMITY: Primary | ICD-10-CM

## 2019-02-26 DIAGNOSIS — M77.32 HEEL SPUR, LEFT: ICD-10-CM

## 2019-02-26 PROCEDURE — 99213 OFFICE O/P EST LOW 20 MIN: CPT | Performed by: PODIATRIST

## 2019-02-26 ASSESSMENT — MIFFLIN-ST. JEOR: SCORE: 1887.47

## 2019-02-26 ASSESSMENT — PAIN SCALES - GENERAL: PAINLEVEL: NO PAIN (0)

## 2019-02-26 NOTE — PROGRESS NOTES
"Chief Complaint   Patient presents with     RECHECK     (5w1d) WB w/tall gray fx boot, no pain today - Left achilles tendonitis, DOI 1/21/2019; LOV 1/28/2019     Diabetes     Type 2     Weight management plan: Patient was referred to their PCP to discuss a diet and exercise plan.     HPI:  Taiwo Escobar is a 53 year old male who is seen in consultation at the request of Archana Lorenzo PA-C.    Pt presents for eval of:   (Onset, Location, L/R, Character, Treatments, Injury if yes)     1. DM Type 2     2. Onset 1/21/2019, posterior Left achilles pain. No injury noted. Presents today with athletic shoes and 1 crutch.  Constant, lump,  Intermittent, sharp, stabbing, throbbing, worse w/first steps after sitting or lying or certain ROM, pain 2-10  Rest helps, aleve    Works for a [employer] as an , desk job.      EXAM:Vitals: /80 (BP Location: Right arm, Cuff Size: Adult Large)   Ht 1.816 m (5' 11.5\")   Wt 102.2 kg (225 lb 6.4 oz)   BMI 31.00 kg/m    BMI= Body mass index is 31 kg/m .    General appearance: Patient is alert and fully cooperative with history & exam.  No sign of distress is noted during the visit.     Psychiatric: Affect is pleasant & appropriate.  Patient appears motivated to improve health.     Respiratory: Breathing is regular & unlabored while sitting.     HEENT: Hearing is intact to spoken word.  Speech is clear.  No gross evidence of visual impairment that would impact ambulation.     Vascular: DP & PT pulses are intact & regular bilaterally.  No significant edema or varicosities noted.  CFT and skin temperature is normal to both lower extremities.     Neurologic: Lower extremity sensation is intact to light touch.  No evidence of weakness or contracture in the lower extremities.  No evidence of neuropathy.    Dermatologic: Skin is intact to both lower extremities with adequate texture, turgor and tone about the integument.  No paronychia or evidence of soft " tissue infection is noted.     Musculoskeletal: Patient is ambulatory without assistive device or brace.  There is gross prominence about the posterior left calcaneus.  Pain with firm palpation at the insertion of the left Achilles tendon.  There is palpable induration.  Subtle erythema but no heat.  Manual muscle strength +5/5 to all 4 quadrants however he does have discomfort with muscle strength testing of the left Achilles tendon.  Relatively high arched foot so low-volume of motion available total throughout the ankle and rear foot however quality of motion is adequate and pain-free with the exception of the left Achilles tendon.  There is also limited range of motion about the left first MPJ and hallux IPJ.    Radiographs: 3 views left foot demonstrate insertional calcific tendinosis at the Achilles tendon insertion of the left foot.  Also mild degenerative changes about the left first MPJ and hallux IPJ.    Hemoglobin A1C (%)   Date Value   12/20/2018 6.5 (H)   06/28/2018 6.3 (H)   12/06/2017 6.3 (H)   06/02/2017 6.2 (H)   11/21/2016 6.2 (H)   11/17/2015 6.1 (H)   04/29/2015 6.3 (H)   02/19/2015 6.1 (H)     Creatinine (mg/dL)   Date Value   12/20/2018 1.21   12/06/2017 0.96   06/02/2017 1.26 (H)   11/21/2016 1.36 (H)   11/17/2015 1.10   02/19/2015 0.85        ASSESSMENT:       ICD-10-CM    1. Achilles tendinitis of left lower extremity M76.62    2. Heel spur, left M77.32         PLAN:  Reviewed patient's chart in [facility].      1/28/2019   Fracture boot 24/7 until pain free times 10 days then progress out of the fracture boot for weightbearing activities but keep it in place during sleep for night splint.  Weightbearing to tolerance in the fracture boot  voltaren to reduce inflammation.  Discussed potential risks and complications.  Follow up 4 weeks  letter for light duty work in a fracture boot weightbearing as tolerated  Obtained and interpreted radiographs  Discussed alternative treatment options and if this  does not become asymptomatic after a course of conservative options we may consider detach reattach of the Achilles tendon left foot.    2/26/2019  Patient was instructed to discontinue the oral anti-inflammatory  Discontinue the fracture boot during the day and progress to activities as tolerated in regular shoe gear  Continue the fracture boot at night x2 more weeks for a night splint  Home as part of daily routine  Avoid shoes that cause irritation or pressure to the previous prominence about the Achilles tendon  Follow-up in 1 month if this remains symptomatic however he has not had symptoms now for 3 weeks.  Return to regular work activity regular work shoes without restriction.  All questions were answered to his satisfaction.      Bolivar Perez DPM

## 2019-07-10 DIAGNOSIS — E11.9 TYPE 2 DIABETES MELLITUS WITHOUT COMPLICATION, WITHOUT LONG-TERM CURRENT USE OF INSULIN (H): ICD-10-CM

## 2019-07-10 RX ORDER — METFORMIN HCL 500 MG
TABLET, EXTENDED RELEASE 24 HR ORAL
Qty: 30 TABLET | Refills: 0 | Status: SHIPPED | OUTPATIENT
Start: 2019-07-10 | End: 2019-08-15

## 2019-07-10 NOTE — TELEPHONE ENCOUNTER
Metformin  Medication is being filled for 1 time refill only due to:  Patient needs to be seen because DM OV and fasting labs.    Johana Ji, RN, BSN

## 2019-08-07 NOTE — PROGRESS NOTES
Subjective     Taiwo Escobar is a 54 year old male who presents to clinic today for the following health issues:    History of Present Illness        Diabetes:   He presents for follow up of diabetes.  He is checking home blood glucose a few times a week. He checks blood glucose before meals.  Blood glucose is never over 200 and never under 70. When his blood glucose is low, the patient is asymptomatic for confusion, blurred vision, lethargy and reports not feeling dizzy, shaky, or weak.  He has no concerns regarding his diabetes at this time.  He is not experiencing numbness or burning in feet, excessive thirst, blurry vision, weight changes or redness, sores or blisters on feet. The patient has had a diabetic eye exam in the last 12 months. Eye exam performed on Nov. 2018.    Diabetes Management Resources    He eats 2-3 servings of fruits and vegetables daily.He consumes 0 sweetened beverage(s) daily.  He is taking medications regularly.    -Morning sugars range 125-140  -Afternoon sugars range 110-120   -Diet has changed a lot, smaller quantities and more frequent meals, less red meat, more variety of vegetables/fruits, lower carbohydrates   -Daily exercise, had a little set back this winter (injured his achilles tendon - has been following with podiatry), this has been improving     Hyperlipidemia Follow-Up      Are you having any of the following symptoms? (Select all that apply)  No complaints of shortness of breath, chest pain or pressure.  No increased sweating or nausea with activity.  No left-sided neck or arm pain.  No complaints of pain in calves when walking 1-2 blocks.    Are you regularly taking any medication or supplement to lower your cholesterol?   Yes- Lipitor    Are you having muscle aches or other side effects that you think could be caused by your cholesterol lowering medication?  No    Hypertension Follow-up      Do you check your blood pressure regularly outside of the clinic? Yes      Are you following a low salt diet? Yes    Are your blood pressures ever more than 140 on the top number (systolic) OR more   than 90 on the bottom number (diastolic), for example 140/90? No    -Tolerating blood pressure medication well. No side effects.   -Blood pressures have never lower than 116/70, he denies orthostatic symptoms. He denies chest pain, SOB, lightheadedness, headaches, lower extremity swelling or changes in urination.     Patient Active Problem List   Diagnosis     Obesity     Type 2 diabetes mellitus without complication (H)     Essential hypertension     Hyperlipidemia LDL goal <100     Past Surgical History:   Procedure Laterality Date     COLONOSCOPY N/A 4/15/2016    Procedure: COLONOSCOPY;  Surgeon: Thanh Martin MD;  Location: [facility]     NO HISTORY OF SURGERY         Social History     Tobacco Use     Smoking status: Never Smoker     Smokeless tobacco: Never Used   Substance Use Topics     Alcohol use: Yes     Comment: a few days per week     Family History   Problem Relation Age of Onset     C.A.D. Mother         stents, MI age 62     Lipids Mother      Hypertension No family hx of      Diabetes No family hx of          Current Outpatient Medications   Medication Sig Dispense Refill     aspirin 81 MG tablet Take 1 tablet (81 mg) by mouth daily 90 tablet 3     atorvastatin (LIPITOR) 10 MG tablet Take 1 tablet (10 mg) by mouth daily 90 tablet 3     lisinopril-hydrochlorothiazide (PRINZIDE/ZESTORETIC) 20-25 MG tablet Take 1 tablet by mouth daily 90 tablet 3     metFORMIN (GLUCOPHAGE-XR) 500 MG 24 hr tablet TAKE 1 TABLET BY MOUTH once DAILY 90 tablet 1     sildenafil (REVATIO) 20 MG tablet 1-2 tablets 30 min to 4 hours before intercourse. 18 tablet 3     Allergies   Allergen Reactions     No Known Drug Allergies      Recent Labs   Lab Test 08/15/19  0900 12/20/18  1034 06/28/18  1606 06/28/18  1605 12/06/17  1621 06/02/17  1549 11/21/16  0922 11/17/15  1030  10/20/14  0914   A1C 6.3*  "6.5*  --  6.3* 6.3* 6.2* 6.2* 6.1*   < > 6.3*   LDL  --   --  53  --   --  40 105*  --    < > 103   HDL  --   --  50  --   --  51 44  --    < > 57   TRIG  --   --  87  --   --  59 158*  --    < > 118   ALT  --   --   --   --   --   --  20 26  --  13   CR  --  1.21  --   --  0.96 1.26* 1.36* 1.10   < > 0.88   GFRESTIMATED  --  67  --   --  81 60* 55* 70   < > >90  Non  GFR Calc     GFRESTBLACK  --  77  --   --  >90 72 66 85   < > >90   GFR Calc     POTASSIUM  --  5.0  --   --  3.7 4.0 4.5 4.2   < > 4.3   TSH  --  1.07  --   --   --   --  0.89  --   --  0.84    < > = values in this interval not displayed.      BP Readings from Last 3 Encounters:   08/15/19 122/76   02/26/19 136/80   01/28/19 118/80    Wt Readings from Last 3 Encounters:   08/15/19 101.6 kg (224 lb)   02/26/19 102.2 kg (225 lb 6.4 oz)   01/28/19 102.2 kg (225 lb 6.4 oz)               Review of Systems   ROS COMP: Constitutional, cardiovascular, pulmonary, GI, , neuro, skin, and endocrine systems are negative, except as otherwise noted.      Objective    /76   Pulse 69   Temp 97.2  F (36.2  C) (Temporal)   Resp 16   Ht 1.816 m (5' 11.5\")   Wt 101.6 kg (224 lb)   SpO2 99%   BMI 30.81 kg/m    Body mass index is 30.81 kg/m .  Physical Exam   GENERAL: healthy, alert and no distress  EYES: Eyes grossly normal to inspection, PERRL and conjunctivae and sclerae normal  RESP: lungs clear to auscultation - no rales, rhonchi or wheezes  CV: regular rate and rhythm, normal S1 S2, no S3 or S4, no murmur, click or rub, no peripheral edema and peripheral pulses strong  NEURO: CN 2-12 grossly intact. Gait is stable.   SKIN: Increased erythema and telangectasias on nose and cheeks bilaterally  PSYCH: mentation appears normal, affect normal/bright    Diagnostic Test Results:  Labs reviewed in Epic  Results for orders placed or performed in visit on 08/15/19 (from the past 24 hour(s))   HEMOGLOBIN A1C   Result Value Ref Range "    Hemoglobin A1C 6.3 (H) 0 - 5.6 %         Assessment & Plan       ICD-10-CM    1. Type 2 diabetes mellitus without complication, without long-term current use of insulin (H) E11.9 HEMOGLOBIN A1C     HEMOGLOBIN A1C     metFORMIN (GLUCOPHAGE-XR) 500 MG 24 hr tablet   2. Essential hypertension I10 Basic metabolic panel  (Ca, Cl, CO2, Creat, Gluc, K, Na, BUN)     Basic metabolic panel  (Ca, Cl, CO2, Creat, Gluc, K, Na, BUN)   3. Hyperlipidemia LDL goal <100 E78.5 Lipid panel reflex to direct LDL Fasting     Lipid panel reflex to direct LDL Fasting   4. Erectile dysfunction, unspecified erectile dysfunction type N52.9 sildenafil (REVATIO) 20 MG tablet   5. Rosacea L71.9        1. Patient's A1c has improved from 6.5 to 6.3. Advised patient to continue with lifestyle modifications, such as consuming a well-rounded, lower carbohydrate diet and participating in regular exercise. Continue with the current dose of Metformin (500 mg daily). Will plan on follow up in 6 months. Of note, patient will schedule an ophthalmologist appointment in the fall.     2. BP at goal today. Continue with current medication regimen. Informed patient to notify clinic if his blood pressure is <110/70 on a regular basis or if he develops symptoms of orthostatic hypotesnion, such as positional lightheadedness. Will reassess BP at next visit.     3. Obtained fasting lipid panel today. Will notify patient of results. In the meantime, patient should contiue with 10 mg of atorvastatin.     4. Stable on current medication regimen. Refilled his Revatio (20 mg) today.    5. Patient has seen dermatology in the past for his rosacea. He was advised to wear sunscreen daily and monitor his diet (I.e. Reduce intake of spicy foods). Informed patient to notify clinic if his rosacea worsens over the next few months and we can prescribe metronidazole gel at that time.    Patient voiced understanding and agreement of plan of care.     BMI:   Estimated body mass  "index is 30.81 kg/m  as calculated from the following:    Height as of this encounter: 1.816 m (5' 11.5\").    Weight as of this encounter: 101.6 kg (224 lb).   Weight management plan: Discussed healthy diet and exercise guidelines    Return in about 6 months (around 2/15/2020).    Patient was seen with Korina Hammond PA-S2     NORMA Acosta-C  [facility]    "

## 2019-08-15 ENCOUNTER — OFFICE VISIT (OUTPATIENT)
Dept: FAMILY MEDICINE | Facility: OTHER | Age: 54
End: 2019-08-15
Payer: COMMERCIAL

## 2019-08-15 VITALS
DIASTOLIC BLOOD PRESSURE: 76 MMHG | SYSTOLIC BLOOD PRESSURE: 122 MMHG | RESPIRATION RATE: 16 BRPM | WEIGHT: 224 LBS | HEART RATE: 69 BPM | HEIGHT: 72 IN | BODY MASS INDEX: 30.34 KG/M2 | OXYGEN SATURATION: 99 % | TEMPERATURE: 97.2 F

## 2019-08-15 DIAGNOSIS — N52.9 ERECTILE DYSFUNCTION, UNSPECIFIED ERECTILE DYSFUNCTION TYPE: ICD-10-CM

## 2019-08-15 DIAGNOSIS — E78.5 HYPERLIPIDEMIA LDL GOAL <100: ICD-10-CM

## 2019-08-15 DIAGNOSIS — E11.9 TYPE 2 DIABETES MELLITUS WITHOUT COMPLICATION, WITHOUT LONG-TERM CURRENT USE OF INSULIN (H): Primary | ICD-10-CM

## 2019-08-15 DIAGNOSIS — I10 ESSENTIAL HYPERTENSION: ICD-10-CM

## 2019-08-15 DIAGNOSIS — L71.9 ROSACEA: ICD-10-CM

## 2019-08-15 LAB
ANION GAP SERPL CALCULATED.3IONS-SCNC: 7 MMOL/L (ref 3–14)
BUN SERPL-MCNC: 23 MG/DL (ref 7–30)
CALCIUM SERPL-MCNC: 9.3 MG/DL (ref 8.5–10.1)
CHLORIDE SERPL-SCNC: 103 MMOL/L (ref 94–109)
CHOLEST SERPL-MCNC: 120 MG/DL
CO2 SERPL-SCNC: 28 MMOL/L (ref 20–32)
CREAT SERPL-MCNC: 1.17 MG/DL (ref 0.66–1.25)
GFR SERPL CREATININE-BSD FRML MDRD: 69 ML/MIN/{1.73_M2}
GLUCOSE SERPL-MCNC: 125 MG/DL (ref 70–99)
HBA1C MFR BLD: 6.3 % (ref 0–5.6)
HDLC SERPL-MCNC: 45 MG/DL
LDLC SERPL CALC-MCNC: 54 MG/DL
NONHDLC SERPL-MCNC: 75 MG/DL
POTASSIUM SERPL-SCNC: 4.6 MMOL/L (ref 3.4–5.3)
SODIUM SERPL-SCNC: 138 MMOL/L (ref 133–144)
TRIGL SERPL-MCNC: 106 MG/DL

## 2019-08-15 PROCEDURE — 80061 LIPID PANEL: CPT | Performed by: PHYSICIAN ASSISTANT

## 2019-08-15 PROCEDURE — 80048 BASIC METABOLIC PNL TOTAL CA: CPT | Performed by: PHYSICIAN ASSISTANT

## 2019-08-15 PROCEDURE — 36415 COLL VENOUS BLD VENIPUNCTURE: CPT | Performed by: PHYSICIAN ASSISTANT

## 2019-08-15 PROCEDURE — 83036 HEMOGLOBIN GLYCOSYLATED A1C: CPT | Performed by: PHYSICIAN ASSISTANT

## 2019-08-15 PROCEDURE — 99214 OFFICE O/P EST MOD 30 MIN: CPT | Performed by: PHYSICIAN ASSISTANT

## 2019-08-15 RX ORDER — SILDENAFIL CITRATE 20 MG/1
TABLET ORAL
Qty: 18 TABLET | Refills: 3 | Status: SHIPPED | OUTPATIENT
Start: 2019-08-15 | End: 2020-07-27

## 2019-08-15 RX ORDER — METFORMIN HCL 500 MG
TABLET, EXTENDED RELEASE 24 HR ORAL
Qty: 90 TABLET | Refills: 1 | Status: SHIPPED | OUTPATIENT
Start: 2019-08-15 | End: 2020-01-29

## 2019-08-15 ASSESSMENT — MIFFLIN-ST. JEOR: SCORE: 1876.12

## 2019-09-28 ENCOUNTER — HEALTH MAINTENANCE LETTER (OUTPATIENT)
Age: 54
End: 2019-09-28

## 2019-11-30 ENCOUNTER — TRANSFERRED RECORDS (OUTPATIENT)
Dept: HEALTH INFORMATION MANAGEMENT | Facility: CLINIC | Age: 54
End: 2019-11-30

## 2019-11-30 LAB — RETINOPATHY: NORMAL

## 2020-01-07 ENCOUNTER — TELEPHONE (OUTPATIENT)
Dept: FAMILY MEDICINE | Facility: OTHER | Age: 55
End: 2020-01-07

## 2020-01-07 NOTE — TELEPHONE ENCOUNTER
Summary:    Patient is due/failing the following:   Eye exam-however if previously completed please ask when and where to update his records.  Patient is due for a diabetic follow up and labs in February     Action needed:   Schedule an eye exam    Type of outreach:    Phone, left message for patient to call back.     Questions for provider review:    None                                                                                                                                    Sahra Grewal CMA       Chart routed to Care Team .

## 2020-01-08 ENCOUNTER — TELEPHONE (OUTPATIENT)
Dept: FAMILY MEDICINE | Facility: OTHER | Age: 55
End: 2020-01-08

## 2020-01-08 DIAGNOSIS — E78.5 HYPERLIPIDEMIA LDL GOAL <100: ICD-10-CM

## 2020-01-08 DIAGNOSIS — I10 ESSENTIAL HYPERTENSION: ICD-10-CM

## 2020-01-08 RX ORDER — ATORVASTATIN CALCIUM 10 MG/1
TABLET, FILM COATED ORAL
Qty: 90 TABLET | Refills: 1 | Status: SHIPPED | OUTPATIENT
Start: 2020-01-08 | End: 2020-06-24

## 2020-01-08 RX ORDER — LISINOPRIL AND HYDROCHLOROTHIAZIDE 20; 25 MG/1; MG/1
TABLET ORAL
Qty: 90 TABLET | Refills: 1 | Status: SHIPPED | OUTPATIENT
Start: 2020-01-08 | End: 2020-06-24

## 2020-01-08 NOTE — TELEPHONE ENCOUNTER
Pres[place] [facility].    Johana Ji, RN, BSN

## 2020-01-22 NOTE — PROGRESS NOTES
Subjective     Taiwo Escobar is a 54 year old male who presents to clinic today for the following health issues:    History of Present Illness        Diabetes:   He presents for follow up of diabetes.  He is checking home blood glucose a few times a week. He checks blood glucose before meals.  Blood glucose is never over 200 and never under 70. When his blood glucose is low, the patient is asymptomatic for confusion, blurred vision, lethargy and reports not feeling dizzy, shaky, or weak.  He has no concerns regarding his diabetes at this time.  He is not experiencing numbness or burning in feet, excessive thirst, blurry vision, weight changes or redness, sores or blisters on feet. The patient has had a diabetic eye exam in the last 12 months. Eye exam performed on 11-. Location of last eye exam [facility].    He consumes 0 sweetened beverage(s) daily.He exercises with enough effort to increase his heart rate 30 to 60 minutes per day.  He exercises with enough effort to increase his heart rate 4 days per week.   He is taking medications regularly.     Hyperlipidemia Follow-Up      Are you regularly taking any medication or supplement to lower your cholesterol?   Yes- Lipitor    Are you having muscle aches or other side effects that you think could be caused by your cholesterol lowering medication?  No    Hypertension Follow-up      Do you check your blood pressure regularly outside of the clinic? Yes     Are you following a low salt diet? Yes    Are your blood pressures ever more than 140 on the top number (systolic) OR more   than 90 on the bottom number (diastolic), for example 140/90? No    Reviewed and updated as needed this visit by Provider  Allergies  Meds  Problems  Med Hx    Review of Systems   GENERAL: Denies fever, fatigue, weakness, weight gain, or weight loss.  CARDIOVASCULAR: Denies chest pain, shortness of breath, irregular heartbeats,  palpitations, or edema.  RESPIRATORY: Denies  "cough, hemoptysis, and shortness of breath.  NEUROLOGIC: Denies headache, fainting, dizziness, memory loss, numbness, tingling, or seizures.  PSYCHIATRIC: Denies depression, anxiety, mood swings, and thoughts of suicide.        Objective    /78   Pulse 73   Temp 97.1  F (36.2  C) (Temporal)   Resp 16   Ht 1.816 m (5' 11.5\")   Wt 102.5 kg (226 lb)   SpO2 99%   BMI 31.08 kg/m    Body mass index is 31.08 kg/m .  Physical Exam   GENERAL: healthy, alert and no distress  RESP: lungs clear to auscultation - no rales, rhonchi or wheezes  CV: regular rate and rhythm, normal S1 S2, no S3 or S4, no murmur, click or rub, no peripheral edema and peripheral pulses strong  NEURO: Normal strength and tone, mentation intact and speech normal. Gait is stable.   PSYCH: mentation appears normal, affect normal/bright  Diabetic foot exam: normal DP and PT pulses, no trophic changes or ulcerative lesions, normal sensory exam and normal monofilament exam    Diagnostic Test Results:  Lab Results   Component Value Date    A1C 6.6 01/29/2020    A1C 6.3 08/15/2019    A1C 6.5 12/20/2018    A1C 6.3 06/28/2018    A1C 6.3 12/06/2017       Assessment & Plan       ICD-10-CM    1. Type 2 diabetes mellitus without complication, without long-term current use of insulin (H) E11.9 Albumin Random Urine Quantitative with Creat Ratio     Hemoglobin A1c     metFORMIN (GLUCOPHAGE-XR) 500 MG 24 hr tablet     FOOT EXAM   2. Essential hypertension I10         1. Diabetes remains under good control. Will continue current dose of metformin along with a low carb diet. I recommend he pursue more routine exercise.    2. BP is stable so will continue current dose of Prinzide.    Follow up in 6 months.        Moises Bell PA-C  [facility]    "

## 2020-01-29 ENCOUNTER — OFFICE VISIT (OUTPATIENT)
Dept: FAMILY MEDICINE | Facility: OTHER | Age: 55
End: 2020-01-29
Payer: COMMERCIAL

## 2020-01-29 VITALS
SYSTOLIC BLOOD PRESSURE: 120 MMHG | BODY MASS INDEX: 30.61 KG/M2 | RESPIRATION RATE: 16 BRPM | DIASTOLIC BLOOD PRESSURE: 78 MMHG | HEIGHT: 72 IN | OXYGEN SATURATION: 99 % | HEART RATE: 73 BPM | TEMPERATURE: 97.1 F | WEIGHT: 226 LBS

## 2020-01-29 DIAGNOSIS — E11.9 TYPE 2 DIABETES MELLITUS WITHOUT COMPLICATION, WITHOUT LONG-TERM CURRENT USE OF INSULIN (H): Primary | ICD-10-CM

## 2020-01-29 DIAGNOSIS — I10 ESSENTIAL HYPERTENSION: ICD-10-CM

## 2020-01-29 LAB
CREAT UR-MCNC: 88 MG/DL
HBA1C MFR BLD: 6.6 % (ref 0–5.6)
MICROALBUMIN UR-MCNC: 18 MG/L
MICROALBUMIN/CREAT UR: 19.93 MG/G CR (ref 0–17)

## 2020-01-29 PROCEDURE — 99207 C FOOT EXAM  NO CHARGE: CPT | Performed by: PHYSICIAN ASSISTANT

## 2020-01-29 PROCEDURE — 82043 UR ALBUMIN QUANTITATIVE: CPT | Performed by: PHYSICIAN ASSISTANT

## 2020-01-29 PROCEDURE — 83036 HEMOGLOBIN GLYCOSYLATED A1C: CPT | Performed by: PHYSICIAN ASSISTANT

## 2020-01-29 PROCEDURE — 36415 COLL VENOUS BLD VENIPUNCTURE: CPT | Performed by: PHYSICIAN ASSISTANT

## 2020-01-29 PROCEDURE — 99214 OFFICE O/P EST MOD 30 MIN: CPT | Performed by: PHYSICIAN ASSISTANT

## 2020-01-29 RX ORDER — METFORMIN HCL 500 MG
TABLET, EXTENDED RELEASE 24 HR ORAL
Qty: 90 TABLET | Refills: 1 | Status: SHIPPED | OUTPATIENT
Start: 2020-01-29 | End: 2020-07-27

## 2020-01-29 ASSESSMENT — MIFFLIN-ST. JEOR: SCORE: 1885.19

## 2020-01-30 NOTE — TELEPHONE ENCOUNTER
Please abstract the following data from this visit with this patient into the appropriate field in Epic:    Tests that can be patient reported without a hard copy:    Eye exam with ophthalmology on this date: 11/30/2019 [facility]     Other Tests found in the patient's chart through Chart Review/Care Everywhere:        Note to Abstraction: If this section is blank, no results were found via Chart Review/Care Everywhere.      Sahra Grewal, DA

## 2020-06-24 DIAGNOSIS — I10 ESSENTIAL HYPERTENSION: ICD-10-CM

## 2020-06-24 DIAGNOSIS — E78.5 HYPERLIPIDEMIA LDL GOAL <100: ICD-10-CM

## 2020-06-24 RX ORDER — ATORVASTATIN CALCIUM 10 MG/1
TABLET, FILM COATED ORAL
Qty: 90 TABLET | Refills: 1 | Status: SHIPPED | OUTPATIENT
Start: 2020-06-24 | End: 2020-12-11

## 2020-06-24 RX ORDER — LISINOPRIL AND HYDROCHLOROTHIAZIDE 20; 25 MG/1; MG/1
TABLET ORAL
Qty: 90 TABLET | Refills: 1 | Status: SHIPPED | OUTPATIENT
Start: 2020-06-24 | End: 2020-12-11

## 2020-06-24 NOTE — TELEPHONE ENCOUNTER
[place] [facility] [place]    Johana Ji, RN, BSN

## 2020-07-24 DIAGNOSIS — E11.9 TYPE 2 DIABETES MELLITUS WITHOUT COMPLICATION, WITHOUT LONG-TERM CURRENT USE OF INSULIN (H): ICD-10-CM

## 2020-07-24 RX ORDER — METFORMIN HCL 500 MG
TABLET, EXTENDED RELEASE 24 HR ORAL
Qty: 90 TABLET | Refills: 1 | OUTPATIENT
Start: 2020-07-24

## 2020-07-24 NOTE — TELEPHONE ENCOUNTER
Pending Prescriptions:                       Disp   Refills    metFORMIN (GLUCOPHAGE-XR) 500 MG 24 hr tab*90 tab*1        Sig: TAKE 1 TABLET BY MOUTH once DAILY    Patient is due for DM F/U. Please call and schedule.    Nanette Gary, MSN, RN

## 2020-07-24 NOTE — TELEPHONE ENCOUNTER
Called patient and left a message with his wife. Need to schedule a virtual visit per note below for DM. Thank you

## 2020-07-24 NOTE — PROGRESS NOTES
"Taiwo Escobar is a 55 year old male who is being evaluated via a billable telephone visit.      The patient has been notified of following:     \"This telephone visit will be conducted via a call between you and your physician/provider. We have found that certain health care needs can be provided without the need for a physical exam.  This service lets us provide the care you need with a short phone conversation.  If a prescription is necessary we can send it directly to your pharmacy.  If lab work is needed we can place an order for that and you can then stop by our lab to have the test done at a later time.    Telephone visits are billed at different rates depending on your insurance coverage. During this emergency period, for some insurers they may be billed the same as an in-person visit.  Please reach out to your insurance provider with any questions.    If during the course of the call the physician/provider feels a telephone visit is not appropriate, you will not be charged for this service.\"    Patient has given verbal consent for Telephone visit?  Yes    What phone number would you like to be contacted at? [phone]    How would you like to obtain your AVS? Mariant    Ana Laura     Taiwo Escobar is a 55 year old male who presents via phone visit today for the following health issues:    HPI    Diabetes Follow-up    How often are you checking your blood sugar? A few times a week  What time of day are you checking your blood sugars (select all that apply)?  Before and after meals  Have you had any blood sugars above 200?  No  Have you had any blood sugars below 70?  No    What symptoms do you notice when your blood sugar is low?  None    What concerns do you have today about your diabetes? None     Do you have any of these symptoms? (Select all that apply)  No numbness or tingling in feet.  No redness, sores or blisters on feet.  No complaints of excessive thirst.  No reports of blurry vision.  " No significant changes to weight.    He feels his diabetes remains under good control. He has snacked somewhat more frequently given the COVID-19 pandemic but is trying to stay active.    Hyperlipidemia Follow-Up      Are you regularly taking any medication or supplement to lower your cholesterol?   Yes- Lipitor    Are you having muscle aches or other side effects that you think could be caused by your cholesterol lowering medication?  No    Hypertension Follow-up      Do you check your blood pressure regularly outside of the clinic? Yes     Are you following a low salt diet? Yes    Are your blood pressures ever more than 140 on the top number (systolic) OR more    than 90 on the bottom number (diastolic), for example 140/90? Yes    BP Readings from Last 2 Encounters:   01/29/20 120/78   08/15/19 122/76     Hemoglobin A1C (%)   Date Value   01/29/2020 6.6 (H)   08/15/2019 6.3 (H)     LDL Cholesterol Calculated (mg/dL)   Date Value   08/15/2019 54   06/28/2018 53       Reviewed and updated as needed this visit by Provider  Allergies  Meds  Problems  Med Hx     Review of Systems   GENERAL: Denies fever, fatigue, weakness, weight gain, or weight loss.  CARDIOVASCULAR: Denies chest pain, shortness of breath, irregular heartbeats, palpitations, or edema.  RESPIRATORY: Denies cough, hemoptysis, and shortness of breath.  NEUROLOGIC: Denies headache, fainting, dizziness, memory loss, numbness, tingling, or seizures.  PSYCHIATRIC: Denies depression, anxiety, mood swings, and thoughts of suicide.       Objective   Reported vitals:  There were no vitals taken for this visit.   .General: alert and no distress over the phone  PSYCH: Alert and oriented times 3; coherent speech, normal   rate and volume, able to articulate logical thoughts, able   to abstract reason, no tangential thoughts, no hallucinations   or delusions.His affect is normal  RESP: No cough, no audible wheezing, able to talk in full sentences  Remainder of  exam unable to be completed due to telephone visits    Diagnostic Test Results:  Lab Results   Component Value Date    A1C 6.6 01/29/2020    A1C 6.3 08/15/2019    A1C 6.5 12/20/2018    A1C 6.3 06/28/2018    A1C 6.3 12/06/2017       Assessment/Plan:      ICD-10-CM    1. Type 2 diabetes mellitus without complication, without long-term current use of insulin (H)  E11.9 metFORMIN (GLUCOPHAGE-XR) 500 MG 24 hr tablet     Hemoglobin A1c   2. Essential hypertension  I10 Basic metabolic panel  (Ca, Cl, CO2, Creat, Gluc, K, Na, BUN)   3. Hyperlipidemia LDL goal <100  E78.5 Lipid panel reflex to direct LDL Fasting   4. Erectile dysfunction, unspecified erectile dysfunction type  N52.9 sildenafil (REVATIO) 20 MG tablet       1. Will order updated A1c to be completed within the next week. Continue current dose of metformin. I encouraged a low sugar, low carb diet with routine exercise.    2. Updated fasting BMP ordered. Continue Zestoretic.    3. Updated lipid panel ordered. Continue Lipitor.    4. Will refill sildenafil to use as needed prior to intercourse.      Phone call duration: 6 minutes    Moises Bell PA-C

## 2020-07-27 ENCOUNTER — VIRTUAL VISIT (OUTPATIENT)
Dept: FAMILY MEDICINE | Facility: OTHER | Age: 55
End: 2020-07-27
Payer: COMMERCIAL

## 2020-07-27 DIAGNOSIS — I10 ESSENTIAL HYPERTENSION: ICD-10-CM

## 2020-07-27 DIAGNOSIS — E11.9 TYPE 2 DIABETES MELLITUS WITHOUT COMPLICATION, WITHOUT LONG-TERM CURRENT USE OF INSULIN (H): Primary | ICD-10-CM

## 2020-07-27 DIAGNOSIS — E78.5 HYPERLIPIDEMIA LDL GOAL <100: ICD-10-CM

## 2020-07-27 DIAGNOSIS — N52.9 ERECTILE DYSFUNCTION, UNSPECIFIED ERECTILE DYSFUNCTION TYPE: ICD-10-CM

## 2020-07-27 PROCEDURE — 99214 OFFICE O/P EST MOD 30 MIN: CPT | Mod: 95 | Performed by: PHYSICIAN ASSISTANT

## 2020-07-27 RX ORDER — METFORMIN HCL 500 MG
TABLET, EXTENDED RELEASE 24 HR ORAL
Qty: 90 TABLET | Refills: 1 | Status: SHIPPED | OUTPATIENT
Start: 2020-07-27 | End: 2021-01-08

## 2020-07-27 RX ORDER — SILDENAFIL CITRATE 20 MG/1
TABLET ORAL
Qty: 18 TABLET | Refills: 3 | Status: SHIPPED | OUTPATIENT
Start: 2020-07-27 | End: 2021-09-15

## 2020-07-27 NOTE — PATIENT INSTRUCTIONS
Continue current medications.    Will order updated labs and plan on clinic follow up in 6 months.

## 2020-07-29 DIAGNOSIS — E78.5 HYPERLIPIDEMIA LDL GOAL <100: ICD-10-CM

## 2020-07-29 DIAGNOSIS — E11.9 TYPE 2 DIABETES MELLITUS WITHOUT COMPLICATION, WITHOUT LONG-TERM CURRENT USE OF INSULIN (H): ICD-10-CM

## 2020-07-29 DIAGNOSIS — I10 ESSENTIAL HYPERTENSION: ICD-10-CM

## 2020-07-29 LAB
ANION GAP SERPL CALCULATED.3IONS-SCNC: 5 MMOL/L (ref 3–14)
BUN SERPL-MCNC: 12 MG/DL (ref 7–30)
CALCIUM SERPL-MCNC: 9.3 MG/DL (ref 8.5–10.1)
CHLORIDE SERPL-SCNC: 99 MMOL/L (ref 94–109)
CHOLEST SERPL-MCNC: 133 MG/DL
CO2 SERPL-SCNC: 30 MMOL/L (ref 20–32)
CREAT SERPL-MCNC: 1.07 MG/DL (ref 0.66–1.25)
GFR SERPL CREATININE-BSD FRML MDRD: 77 ML/MIN/{1.73_M2}
GLUCOSE SERPL-MCNC: 125 MG/DL (ref 70–99)
HBA1C MFR BLD: 6.9 % (ref 0–5.6)
HDLC SERPL-MCNC: 57 MG/DL
LDLC SERPL CALC-MCNC: 62 MG/DL
NONHDLC SERPL-MCNC: 76 MG/DL
POTASSIUM SERPL-SCNC: 4.6 MMOL/L (ref 3.4–5.3)
SODIUM SERPL-SCNC: 134 MMOL/L (ref 133–144)
TRIGL SERPL-MCNC: 72 MG/DL

## 2020-07-29 PROCEDURE — 36415 COLL VENOUS BLD VENIPUNCTURE: CPT | Performed by: PHYSICIAN ASSISTANT

## 2020-07-29 PROCEDURE — 80061 LIPID PANEL: CPT | Performed by: PHYSICIAN ASSISTANT

## 2020-07-29 PROCEDURE — 80048 BASIC METABOLIC PNL TOTAL CA: CPT | Performed by: PHYSICIAN ASSISTANT

## 2020-07-29 PROCEDURE — 83036 HEMOGLOBIN GLYCOSYLATED A1C: CPT | Performed by: PHYSICIAN ASSISTANT

## 2020-12-11 DIAGNOSIS — E78.5 HYPERLIPIDEMIA LDL GOAL <100: ICD-10-CM

## 2020-12-11 DIAGNOSIS — I10 ESSENTIAL HYPERTENSION: ICD-10-CM

## 2020-12-11 RX ORDER — LISINOPRIL AND HYDROCHLOROTHIAZIDE 20; 25 MG/1; MG/1
TABLET ORAL
Qty: 90 TABLET | Refills: 1 | Status: SHIPPED | OUTPATIENT
Start: 2020-12-11 | End: 2021-06-02

## 2020-12-11 RX ORDER — ATORVASTATIN CALCIUM 10 MG/1
TABLET, FILM COATED ORAL
Qty: 90 TABLET | Refills: 1 | Status: SHIPPED | OUTPATIENT
Start: 2020-12-11 | End: 2021-06-02

## 2020-12-11 NOTE — TELEPHONE ENCOUNTER
"Requested Prescriptions   Pending Prescriptions Disp Refills     atorvastatin (LIPITOR) 10 MG tablet [Pharmacy Med Name: atorvastatin 10 mg tablet] 90 tablet 1     Sig: Take 1 tablet by mouth once daily       Statins Protocol Passed - 12/11/2020  8:00 AM        Passed - LDL on file in past 12 months     Recent Labs   Lab Test 07/29/20  1422   LDL 62             Passed - No abnormal creatine kinase in past 12 months     No lab results found.             Passed - Recent (12 mo) or future (30 days) visit within the authorizing provider's specialty     Patient has had an office visit with the authorizing provider or a provider within the authorizing providers department within the previous 12 mos or has a future within next 30 days. See \"Patient Info\" tab in inbasket, or \"Choose Columns\" in Meds & Orders section of the refill encounter.              Passed - Medication is active on med list        Passed - Patient is age 18 or older           lisinopril-hydrochlorothiazide (ZESTORETIC) 20-25 MG tablet [Pharmacy Med Name: lisinopril 20 mg-hydrochlorothiazide 25 mg tablet] 90 tablet 1     Sig: TAKE ONE TABLET BY MOUTH once daily       Diuretics (Including Combos) Protocol Passed - 12/11/2020  8:00 AM        Passed - Blood pressure under 140/90 in past 12 months     BP Readings from Last 3 Encounters:   01/29/20 120/78   08/15/19 122/76   02/26/19 136/80                 Passed - Recent (12 mo) or future (30 days) visit within the authorizing provider's specialty     Patient has had an office visit with the authorizing provider or a provider within the authorizing providers department within the previous 12 mos or has a future within next 30 days. See \"Patient Info\" tab in inbasket, or \"Choose Columns\" in Meds & Orders section of the refill encounter.              Passed - Medication is active on med list        Passed - Patient is age 18 or older        Passed - Normal serum creatinine on file in past 12 months     Recent " "Labs   Lab Test 07/29/20  1422   CR 1.07              Passed - Normal serum potassium on file in past 12 months     Recent Labs   Lab Test 07/29/20  1422   POTASSIUM 4.6                    Passed - Normal serum sodium on file in past 12 months     Recent Labs   Lab Test 07/29/20  1422                ACE Inhibitors (Including Combos) Protocol Passed - 12/11/2020  8:00 AM        Passed - Blood pressure under 140/90 in past 12 months     BP Readings from Last 3 Encounters:   01/29/20 120/78   08/15/19 122/76   02/26/19 136/80                 Passed - Recent (12 mo) or future (30 days) visit within the authorizing provider's specialty     Patient has had an office visit with the authorizing provider or a provider within the authorizing providers department within the previous 12 mos or has a future within next 30 days. See \"Patient Info\" tab in inbasket, or \"Choose Columns\" in Meds & Orders section of the refill encounter.              Passed - Medication is active on med list        Passed - Patient is age 18 or older        Passed - Normal serum creatinine on file in past 12 months     Recent Labs   Lab Test 07/29/20  1422   CR 1.07       Ok to refill medication if creatinine is low          Passed - Normal serum potassium on file in past 12 months     Recent Labs   Lab Test 07/29/20  1422   POTASSIUM 4.6                  Prescription approved per [facility] Refill Protocol.    Isra Mendoza, RN, BSN    "

## 2021-01-08 DIAGNOSIS — E11.9 TYPE 2 DIABETES MELLITUS WITHOUT COMPLICATION, WITHOUT LONG-TERM CURRENT USE OF INSULIN (H): ICD-10-CM

## 2021-01-08 RX ORDER — METFORMIN HCL 500 MG
TABLET, EXTENDED RELEASE 24 HR ORAL
Qty: 90 TABLET | Refills: 0 | Status: SHIPPED | OUTPATIENT
Start: 2021-01-08 | End: 2021-03-03

## 2021-01-08 NOTE — TELEPHONE ENCOUNTER
Prescription approved per [facility] Refill Protocol.    Radha Smallwood RN

## 2021-01-09 ENCOUNTER — HEALTH MAINTENANCE LETTER (OUTPATIENT)
Age: 56
End: 2021-01-09

## 2021-03-02 NOTE — PROGRESS NOTES
Taiwo is a 55 year old who is being evaluated via a billable video visit.      How would you like to obtain your AVS? MyChart  If the video visit is dropped, the invitation should be resent by: Text to cell phone: [phone]  Will anyone else be joining your video visit? No    Video Start Time: 10:11 am    Assessment & Plan       ICD-10-CM    1. Type 2 diabetes mellitus without complication, without long-term current use of insulin (H)  E11.9 HEMOGLOBIN A1C     Albumin Random Urine Quantitative with Creat Ratio     metFORMIN (GLUCOPHAGE-XR) 500 MG 24 hr tablet   2. Hyperlipidemia LDL goal <100  E78.5    3. Essential hypertension  I10        1. His glucose readings have been stable although his mornings readings are still in the 130s and sometimes low 140s. I recommend he try taking the metformin  mg with dinner instead of breakfast. He will continue to pursue a healthy diet and routine exercise. He will come in for an updated A1c and urine microalbumin on Friday and will plan on follow up in clinic in 6 months.    2. Continue Lipitor.    3. Continue Zestoretic.    Follow up in 6 months.     Moises Bell PA-C  [facility]   Taiwo is a 55 year old who presents for the following health issues:    HPI     He presents for a diabetic follow up. He feels his diabetic readings have been stable but his highest readings are still in the mornings, usually in the 130's. It has been this way for quite a few years regardless of if he eats before bed or not. His readings throughout the rest of the day are typically lower. He is trying to stay active and is planning on getting back outside to ride his electric bicycle with his wife as this weather improves. He denies any issues with his blood pressure or cholesterol medications and feels like overall his health has been stable.     Review of Systems   Constitutional, HEENT, cardiovascular, pulmonary, gi and gu systems are negative,  except as otherwise noted.      Objective         Vitals:  No vitals were obtained today due to virtual visit.    Physical Exam   GENERAL: Healthy, alert and no distress  EYES: Eyes grossly normal to inspection.  No discharge or erythema, or obvious scleral/conjunctival abnormalities.  RESP: No audible wheeze, cough, or visible cyanosis.  No visible retractions or increased work of breathing.    SKIN: Visible skin clear. No significant rash, abnormal pigmentation or lesions.  NEURO: Cranial nerves grossly intact.  Mentation and speech appropriate for age.  PSYCH: Mentation appears normal, affect normal/bright, judgement and insight intact, normal speech and appearance well-groomed.    Lab Results   Component Value Date    A1C 6.9 07/29/2020    A1C 6.6 01/29/2020    A1C 6.3 08/15/2019    A1C 6.5 12/20/2018    A1C 6.3 06/28/2018           Video-Visit Details    Type of service:  Video Visit    Video End Time: 10:21 am    Originating Location (pt. Location): Home    Distant Location (provider location):  [facility]     Platform used for Video Visit: [employer]

## 2021-03-03 ENCOUNTER — VIRTUAL VISIT (OUTPATIENT)
Dept: FAMILY MEDICINE | Facility: OTHER | Age: 56
End: 2021-03-03
Payer: COMMERCIAL

## 2021-03-03 DIAGNOSIS — I10 ESSENTIAL HYPERTENSION: ICD-10-CM

## 2021-03-03 DIAGNOSIS — E78.5 HYPERLIPIDEMIA LDL GOAL <100: ICD-10-CM

## 2021-03-03 DIAGNOSIS — E11.9 TYPE 2 DIABETES MELLITUS WITHOUT COMPLICATION, WITHOUT LONG-TERM CURRENT USE OF INSULIN (H): Primary | ICD-10-CM

## 2021-03-03 PROCEDURE — 99214 OFFICE O/P EST MOD 30 MIN: CPT | Mod: TEL | Performed by: PHYSICIAN ASSISTANT

## 2021-03-03 RX ORDER — METFORMIN HCL 500 MG
TABLET, EXTENDED RELEASE 24 HR ORAL
Qty: 90 TABLET | Refills: 1 | Status: SHIPPED | OUTPATIENT
Start: 2021-03-03 | End: 2021-03-05

## 2021-03-03 NOTE — PATIENT INSTRUCTIONS
Try taking the metformin with dinner.  Come in for labs on Friday.    Follow up in 6 months in the clinic.

## 2021-03-05 ENCOUNTER — TELEPHONE (OUTPATIENT)
Dept: FAMILY MEDICINE | Facility: OTHER | Age: 56
End: 2021-03-05

## 2021-03-05 DIAGNOSIS — E11.9 TYPE 2 DIABETES MELLITUS WITHOUT COMPLICATION, WITHOUT LONG-TERM CURRENT USE OF INSULIN (H): ICD-10-CM

## 2021-03-05 LAB
CREAT UR-MCNC: 70 MG/DL
HBA1C MFR BLD: 7.6 % (ref 0–5.6)
MICROALBUMIN UR-MCNC: 45 MG/L
MICROALBUMIN/CREAT UR: 63.86 MG/G CR (ref 0–17)

## 2021-03-05 PROCEDURE — 36415 COLL VENOUS BLD VENIPUNCTURE: CPT | Performed by: PHYSICIAN ASSISTANT

## 2021-03-05 PROCEDURE — 83036 HEMOGLOBIN GLYCOSYLATED A1C: CPT | Performed by: PHYSICIAN ASSISTANT

## 2021-03-05 PROCEDURE — 82043 UR ALBUMIN QUANTITATIVE: CPT | Performed by: PHYSICIAN ASSISTANT

## 2021-03-05 RX ORDER — METFORMIN HCL 500 MG
1000 TABLET, EXTENDED RELEASE 24 HR ORAL
Qty: 180 TABLET | Refills: 1 | Status: SHIPPED | OUTPATIENT
Start: 2021-03-05 | End: 2021-08-27

## 2021-03-05 NOTE — TELEPHONE ENCOUNTER
LMFP to return call regarding message below. Will also send my chart.    Moises Bell PA-C [facility] [place]             Please call and notify patient that his A1c has slightly increased to 7.6 along with slightly increased protein in his urine, consistent with the higher sugars.  I would like to increase his metformin to 2 tablets with dinner and recommend a healthier diet and more routine exercise as we discussed. Follow up in 6 months. Thanks!     Moises Cordero CMA

## 2021-05-01 ENCOUNTER — TRANSFERRED RECORDS (OUTPATIENT)
Dept: MULTI SPECIALTY CLINIC | Facility: CLINIC | Age: 56
End: 2021-05-01

## 2021-05-01 LAB — RETINOPATHY: NORMAL

## 2021-05-29 DIAGNOSIS — I10 ESSENTIAL HYPERTENSION: ICD-10-CM

## 2021-05-29 DIAGNOSIS — E78.5 HYPERLIPIDEMIA LDL GOAL <100: ICD-10-CM

## 2021-06-02 RX ORDER — LISINOPRIL AND HYDROCHLOROTHIAZIDE 20; 25 MG/1; MG/1
TABLET ORAL
Qty: 90 TABLET | Refills: 1 | Status: SHIPPED | OUTPATIENT
Start: 2021-06-02 | End: 2021-09-15

## 2021-06-02 RX ORDER — ATORVASTATIN CALCIUM 10 MG/1
TABLET, FILM COATED ORAL
Qty: 90 TABLET | Refills: 0 | Status: SHIPPED | OUTPATIENT
Start: 2021-06-02 | End: 2021-09-10

## 2021-06-02 NOTE — TELEPHONE ENCOUNTER
Pending Prescriptions:                       Disp   Refills    lisinopril-hydrochlorothiazide (ZESTORETIC*90 tab*1        Sig: TAKE ONE TABLET BY MOUTH once daily    Signed Prescriptions:                        Disp   Refills    atorvastatin (LIPITOR) 10 MG tablet        90 tab*0        Sig: TAKE ONE TABLET BY MOUTH once daily  Authorizing Provider: ROSA NIEVES  Ordering User: ROD MCELROY      Routing refill request to provider for review/approval because:  Labs not current:  Blood pressure    Rod Mcelroy RN on 6/2/2021 at 10:47 AM

## 2021-06-02 NOTE — TELEPHONE ENCOUNTER
Prescription approved per [facility] Refill Protocol. Justine.  Bruna Boudreaux RN on 6/2/2021 at 10:46 AM

## 2021-09-04 DIAGNOSIS — E78.5 HYPERLIPIDEMIA LDL GOAL <100: ICD-10-CM

## 2021-09-07 NOTE — TELEPHONE ENCOUNTER
"Requested Prescriptions   Pending Prescriptions Disp Refills     atorvastatin (LIPITOR) 10 MG tablet [Pharmacy Med Name: atorvastatin 10 mg tablet] 90 tablet 0     Sig: TAKE ONE TABLET BY MOUTH once daily       [facility] Protocol Failed - 9/4/2021  9:35 AM        Failed - LDL on file in past 12 months     Recent Labs   Lab Test 07/29/20  1422   LDL 62             Passed - No abnormal creatine kinase in past 12 months     No lab results found.             Passed - Recent (12 mo) or future (30 days) visit within the authorizing provider's specialty     Patient has had an office visit with the authorizing provider or a provider within the authorizing providers department within the previous 12 mos or has a future within next 30 days. See \"Patient Info\" tab in inbasket, or \"Choose Columns\" in Meds & Orders section of the refill encounter.              Passed - Medication is active on med list        Passed - Patient is age 18 or older           Routing refill request to provider for review/approval because:  Labs not current:  LDL    Isra Mendoza RN, BSN          "

## 2021-09-10 DIAGNOSIS — E78.5 HYPERLIPIDEMIA LDL GOAL <100: ICD-10-CM

## 2021-09-10 RX ORDER — ATORVASTATIN CALCIUM 10 MG/1
10 TABLET, FILM COATED ORAL DAILY
Qty: 30 TABLET | Refills: 0 | Status: SHIPPED | OUTPATIENT
Start: 2021-09-10 | End: 2021-09-15

## 2021-09-10 RX ORDER — ATORVASTATIN CALCIUM 10 MG/1
TABLET, FILM COATED ORAL
Qty: 90 TABLET | Refills: 0 | OUTPATIENT
Start: 2021-09-10

## 2021-09-15 ENCOUNTER — VIRTUAL VISIT (OUTPATIENT)
Dept: FAMILY MEDICINE | Facility: OTHER | Age: 56
End: 2021-09-15
Payer: COMMERCIAL

## 2021-09-15 DIAGNOSIS — E78.5 HYPERLIPIDEMIA LDL GOAL <100: ICD-10-CM

## 2021-09-15 DIAGNOSIS — I10 ESSENTIAL HYPERTENSION: ICD-10-CM

## 2021-09-15 DIAGNOSIS — Z11.4 SCREENING FOR HIV (HUMAN IMMUNODEFICIENCY VIRUS): ICD-10-CM

## 2021-09-15 DIAGNOSIS — N52.9 ERECTILE DYSFUNCTION, UNSPECIFIED ERECTILE DYSFUNCTION TYPE: ICD-10-CM

## 2021-09-15 DIAGNOSIS — E11.9 TYPE 2 DIABETES MELLITUS WITHOUT COMPLICATION, WITHOUT LONG-TERM CURRENT USE OF INSULIN (H): Primary | ICD-10-CM

## 2021-09-15 PROCEDURE — 99214 OFFICE O/P EST MOD 30 MIN: CPT | Mod: GT | Performed by: PHYSICIAN ASSISTANT

## 2021-09-15 RX ORDER — LISINOPRIL AND HYDROCHLOROTHIAZIDE 20; 25 MG/1; MG/1
1 TABLET ORAL DAILY
Qty: 90 TABLET | Refills: 3 | Status: SHIPPED | OUTPATIENT
Start: 2021-09-15 | End: 2022-09-12

## 2021-09-15 RX ORDER — ATORVASTATIN CALCIUM 10 MG/1
TABLET, FILM COATED ORAL
Qty: 30 TABLET | Refills: 0 | OUTPATIENT
Start: 2021-09-15

## 2021-09-15 RX ORDER — ATORVASTATIN CALCIUM 10 MG/1
10 TABLET, FILM COATED ORAL DAILY
Qty: 30 TABLET | Refills: 0 | Status: SHIPPED | OUTPATIENT
Start: 2021-09-15 | End: 2021-11-12

## 2021-09-15 RX ORDER — SILDENAFIL CITRATE 20 MG/1
TABLET ORAL
Qty: 18 TABLET | Refills: 3 | Status: SHIPPED | OUTPATIENT
Start: 2021-09-15 | End: 2022-03-07

## 2021-09-15 NOTE — PATIENT INSTRUCTIONS
Continue current medications.    Will have you come in tomorrow for labs and will have them check your blood pressure as well.    Follow up in 6 months in the clinic.

## 2021-09-15 NOTE — PROGRESS NOTES
Taiwo is a 56 year old who is being evaluated via a billable video visit.      How would you like to obtain your AVS? MyChart  If the video visit is dropped, the invitation should be resent by: [phone]  Will anyone else be joining your video visit? No    Video Start Time: 10:55 am    Assessment & Plan       ICD-10-CM    1. Type 2 diabetes mellitus without complication, without long-term current use of insulin (H)  E11.9 BASIC METABOLIC PANEL     HEMOGLOBIN A1C   2. Essential hypertension  I10 lisinopril-hydrochlorothiazide (ZESTORETIC) 20-25 MG tablet   3. Hyperlipidemia LDL goal <100  E78.5 Lipid panel reflex to direct LDL Fasting     atorvastatin (LIPITOR) 10 MG tablet   4. Erectile dysfunction, unspecified erectile dysfunction type  N52.9 sildenafil (REVATIO) 20 MG tablet   5. Screening for HIV (human immunodeficiency virus)  Z11.4 HIV Antigen Antibody Combo       1. Still have slightly higher glucose readings in the mornings. Will check updated labs tomorrow and depending on results, will consider adding metformin with breakfast as well. Encouraged a low carb diet and more routine exercise. Will recheck in 6 months.    2. Continue Zestoretic and monitor home blood pressure closely. Will check blood pressure in clinic tomorrow when he comes in for fasting labs.    3. Updated fasting lipid panel ordered for tomorrow. Continue Lipitor.    4. Continue sildenafil as needed.    5. HIV screening to be completed tomorrow.    Follow up in 6 months in clinic.       FELICE Acosta [facility]   Taiwo is a 56 year old who presents for the following health issues:    HPI     He feels his diabetes continues to be fairly well managed. He is still having readings around 140-150 in the mornings even with the addition of metformin XR 1000 mg with dinner but numbers are not worsening. He noticed some diarrhea for 1-2 weeks on the metformin but this has since resolved.     The  sildenafil has been working well for erectile dysfunction.     Review of Systems   Constitutional, cardiovascular, pulmonary, gi and gu systems are negative, except as otherwise noted.      Objective       Vitals:  No vitals were obtained today due to virtual visit.    Physical Exam   GENERAL: Healthy, alert and no distress  EYES: Eyes grossly normal to inspection.  No discharge or erythema, or obvious scleral/conjunctival abnormalities.  RESP: No audible wheeze, cough, or visible cyanosis.  No visible retractions or increased work of breathing.    SKIN: Visible skin clear. No significant rash, abnormal pigmentation or lesions.  NEURO: Cranial nerves grossly intact.  Mentation and speech appropriate for age.  PSYCH: Mentation appears normal, affect normal/bright, judgement and insight intact, normal speech and appearance well-groomed.      Lab Results   Component Value Date    A1C 7.6 03/05/2021    A1C 6.9 07/29/2020    A1C 6.6 01/29/2020    A1C 6.3 08/15/2019    A1C 6.5 12/20/2018         Video-Visit Details    Type of service:  Video Visit    Video End Time: 11:08 am    Originating Location (pt. Location): Home    Distant Location (provider location):  [facility]     Platform used for Video Visit: [employer]

## 2021-09-15 NOTE — Clinical Note
Patient coming in for labs tomorrow (9/16) at 3:45. Can we make sure he gets a blood pressure check with a nurse/MA as well? Thanks.    Mosies Bell PA-C

## 2021-09-16 ENCOUNTER — LAB (OUTPATIENT)
Dept: LAB | Facility: OTHER | Age: 56
End: 2021-09-16
Payer: COMMERCIAL

## 2021-09-16 DIAGNOSIS — E11.9 TYPE 2 DIABETES MELLITUS WITHOUT COMPLICATION, WITHOUT LONG-TERM CURRENT USE OF INSULIN (H): ICD-10-CM

## 2021-09-16 DIAGNOSIS — Z11.4 SCREENING FOR HIV (HUMAN IMMUNODEFICIENCY VIRUS): ICD-10-CM

## 2021-09-16 DIAGNOSIS — E78.5 HYPERLIPIDEMIA LDL GOAL <100: ICD-10-CM

## 2021-09-16 LAB
ANION GAP SERPL CALCULATED.3IONS-SCNC: 5 MMOL/L (ref 3–14)
BUN SERPL-MCNC: 17 MG/DL (ref 7–30)
CALCIUM SERPL-MCNC: 8.7 MG/DL (ref 8.5–10.1)
CHLORIDE BLD-SCNC: 102 MMOL/L (ref 94–109)
CHOLEST SERPL-MCNC: 114 MG/DL
CO2 SERPL-SCNC: 28 MMOL/L (ref 20–32)
CREAT SERPL-MCNC: 1.26 MG/DL (ref 0.66–1.25)
FASTING STATUS PATIENT QL REPORTED: YES
GFR SERPL CREATININE-BSD FRML MDRD: 62 ML/MIN/1.73M2
GLUCOSE BLD-MCNC: 113 MG/DL (ref 70–99)
HBA1C MFR BLD: 6.6 % (ref 0–5.6)
HDLC SERPL-MCNC: 44 MG/DL
LDLC SERPL CALC-MCNC: 47 MG/DL
NONHDLC SERPL-MCNC: 70 MG/DL
POTASSIUM BLD-SCNC: 4.4 MMOL/L (ref 3.4–5.3)
SODIUM SERPL-SCNC: 135 MMOL/L (ref 133–144)
TRIGL SERPL-MCNC: 116 MG/DL

## 2021-09-16 PROCEDURE — 83036 HEMOGLOBIN GLYCOSYLATED A1C: CPT

## 2021-09-16 PROCEDURE — 87389 HIV-1 AG W/HIV-1&-2 AB AG IA: CPT

## 2021-09-16 PROCEDURE — 80061 LIPID PANEL: CPT

## 2021-09-16 PROCEDURE — 36415 COLL VENOUS BLD VENIPUNCTURE: CPT

## 2021-09-16 PROCEDURE — 80048 BASIC METABOLIC PNL TOTAL CA: CPT

## 2021-09-17 DIAGNOSIS — R79.89 ELEVATED SERUM CREATININE: Primary | ICD-10-CM

## 2021-09-17 LAB — HIV 1+2 AB+HIV1 P24 AG SERPL QL IA: NONREACTIVE

## 2021-10-23 ENCOUNTER — HEALTH MAINTENANCE LETTER (OUTPATIENT)
Age: 56
End: 2021-10-23

## 2021-11-09 DIAGNOSIS — E78.5 HYPERLIPIDEMIA LDL GOAL <100: ICD-10-CM

## 2021-11-12 RX ORDER — ATORVASTATIN CALCIUM 10 MG/1
TABLET, FILM COATED ORAL
Qty: 30 TABLET | Refills: 2 | Status: SHIPPED | OUTPATIENT
Start: 2021-11-12 | End: 2022-02-01

## 2021-12-09 DIAGNOSIS — E11.9 TYPE 2 DIABETES MELLITUS WITHOUT COMPLICATION, WITHOUT LONG-TERM CURRENT USE OF INSULIN (H): ICD-10-CM

## 2021-12-10 RX ORDER — METFORMIN HCL 500 MG
1000 TABLET, EXTENDED RELEASE 24 HR ORAL
Qty: 60 TABLET | Refills: 0 | Status: SHIPPED | OUTPATIENT
Start: 2021-12-10 | End: 2022-01-07

## 2022-01-06 DIAGNOSIS — E11.9 TYPE 2 DIABETES MELLITUS WITHOUT COMPLICATION, WITHOUT LONG-TERM CURRENT USE OF INSULIN (H): ICD-10-CM

## 2022-01-07 ENCOUNTER — MYC MEDICAL ADVICE (OUTPATIENT)
Dept: FAMILY MEDICINE | Facility: OTHER | Age: 57
End: 2022-01-07
Payer: COMMERCIAL

## 2022-01-07 RX ORDER — METFORMIN HCL 500 MG
1000 TABLET, EXTENDED RELEASE 24 HR ORAL
Qty: 60 TABLET | Refills: 1 | Status: SHIPPED | OUTPATIENT
Start: 2022-01-07 | End: 2022-02-28

## 2022-01-07 NOTE — TELEPHONE ENCOUNTER
Pending Prescriptions:                       Disp   Refills    metFORMIN (GLUCOPHAGE-XR) 500 MG 24 hr ta*60 tab*1            Sig: Take 2 tablets (1,000 mg) by mouth daily (with           dinner)    Medication is being filled for 1 time lex refill only due to:  Patient is due for due for diabetes visit in march    Please call and help schedule.  Thank you!

## 2022-01-29 DIAGNOSIS — E78.5 HYPERLIPIDEMIA LDL GOAL <100: ICD-10-CM

## 2022-02-01 RX ORDER — ATORVASTATIN CALCIUM 10 MG/1
TABLET, FILM COATED ORAL
Qty: 30 TABLET | Refills: 2 | Status: SHIPPED | OUTPATIENT
Start: 2022-02-01 | End: 2022-03-07

## 2022-02-12 ENCOUNTER — HEALTH MAINTENANCE LETTER (OUTPATIENT)
Age: 57
End: 2022-02-12

## 2022-02-28 DIAGNOSIS — E11.9 TYPE 2 DIABETES MELLITUS WITHOUT COMPLICATION, WITHOUT LONG-TERM CURRENT USE OF INSULIN (H): ICD-10-CM

## 2022-02-28 RX ORDER — METFORMIN HCL 500 MG
TABLET, EXTENDED RELEASE 24 HR ORAL
Qty: 60 TABLET | Refills: 0 | Status: SHIPPED | OUTPATIENT
Start: 2022-02-28 | End: 2022-03-07

## 2022-03-07 ENCOUNTER — VIRTUAL VISIT (OUTPATIENT)
Dept: FAMILY MEDICINE | Facility: OTHER | Age: 57
End: 2022-03-07
Payer: COMMERCIAL

## 2022-03-07 DIAGNOSIS — E11.9 TYPE 2 DIABETES MELLITUS WITHOUT COMPLICATION, WITHOUT LONG-TERM CURRENT USE OF INSULIN (H): Primary | ICD-10-CM

## 2022-03-07 DIAGNOSIS — N52.9 ERECTILE DYSFUNCTION, UNSPECIFIED ERECTILE DYSFUNCTION TYPE: ICD-10-CM

## 2022-03-07 DIAGNOSIS — M54.50 ACUTE MIDLINE LOW BACK PAIN WITHOUT SCIATICA: ICD-10-CM

## 2022-03-07 DIAGNOSIS — N18.2 CHRONIC KIDNEY DISEASE, STAGE 2 (MILD): ICD-10-CM

## 2022-03-07 DIAGNOSIS — I10 ESSENTIAL HYPERTENSION: ICD-10-CM

## 2022-03-07 DIAGNOSIS — E78.5 HYPERLIPIDEMIA LDL GOAL <100: ICD-10-CM

## 2022-03-07 PROCEDURE — 99214 OFFICE O/P EST MOD 30 MIN: CPT | Mod: GT | Performed by: PHYSICIAN ASSISTANT

## 2022-03-07 RX ORDER — CYCLOBENZAPRINE HCL 5 MG
5-10 TABLET ORAL 3 TIMES DAILY PRN
Qty: 30 TABLET | Refills: 1 | Status: SHIPPED | OUTPATIENT
Start: 2022-03-07 | End: 2024-01-04

## 2022-03-07 RX ORDER — SILDENAFIL CITRATE 20 MG/1
TABLET ORAL
Qty: 18 TABLET | Refills: 3 | Status: SHIPPED | OUTPATIENT
Start: 2022-03-07 | End: 2023-06-14

## 2022-03-07 RX ORDER — METFORMIN HCL 500 MG
TABLET, EXTENDED RELEASE 24 HR ORAL
Qty: 180 TABLET | Refills: 1 | Status: SHIPPED | OUTPATIENT
Start: 2022-03-07 | End: 2022-08-23

## 2022-03-07 RX ORDER — ATORVASTATIN CALCIUM 10 MG/1
10 TABLET, FILM COATED ORAL DAILY
Qty: 90 TABLET | Refills: 3 | Status: SHIPPED | OUTPATIENT
Start: 2022-03-07 | End: 2023-02-17

## 2022-03-07 ASSESSMENT — PAIN SCALES - GENERAL: PAINLEVEL: NO PAIN (0)

## 2022-03-07 NOTE — PROGRESS NOTES
Taiwo is a 56 year old who is being evaluated via a billable video visit.      How would you like to obtain your AVS? MyChart  If the video visit is dropped, the invitation should be resent by: Text to cell phone: [phone]   Will anyone else be joining your video visit? No    Video Start Time: 4:06 pm    Assessment & Plan       ICD-10-CM    1. Type 2 diabetes mellitus without complication, without long-term current use of insulin (H)  E11.9 Albumin Random Urine Quantitative with Creat Ratio     HEMOGLOBIN A1C     Basic metabolic panel  (Ca, Cl, CO2, Creat, Gluc, K, Na, BUN)     metFORMIN (GLUCOPHAGE-XR) 500 MG 24 hr tablet   2. Chronic kidney disease, stage 2 (mild)  N18.2 Albumin Random Urine Quantitative with Creat Ratio   3. Hyperlipidemia LDL goal <100  E78.5 atorvastatin (LIPITOR) 10 MG tablet   4. Essential hypertension  I10 Basic metabolic panel  (Ca, Cl, CO2, Creat, Gluc, K, Na, BUN)   5. Acute midline low back pain without sciatica  M54.50 cyclobenzaprine (FLEXERIL) 5 MG tablet   6. Erectile dysfunction, unspecified erectile dysfunction type  N52.9 sildenafil (REVATIO) 20 MG tablet       1. He will come in for labs tomorrow to recheck his hemoglobin A1c and non-fasting BMP. Will continue metformin and he will continue to pursue a healthier, low carb diet with a more active lifestyle as we further manage his low back pain. Will plan on recheck in clinic in 3-6 months depending on lab results.    2. Updated urine protein ordered. Encouraged increased fluid intake and low salt.    3. Continue Lipitor.    4. Continue Zestoretic.     5. Increased low back pain over the past few months. He has not responded well to OTC analgesics, home stretching or chiropractic as of yet. I recommend trying Flexeril 5-10 mg 3 times daily as needed but recommend he start this at night due to potential drowsiness. I discussed some imaging like an x-ray but he would like to see how he does on the Flexeril first. Continue with  chiropractic, home stretching and try ice/heat. Will touch base again within the next month.    6. Continue sildenafil as needed.      Moises Bell PA-C  [facility] SUSHANT Maravilla is a 56 year old who presents for the following health issues     HPI     Diabetes Follow-up    How often are you checking your blood sugar? A few times a week  What time of day are you checking your blood sugars (select all that apply)?  After meals and At bedtime  Have you had any blood sugars above 200?  No  Have you had any blood sugars below 70?  No    What symptoms do you notice when your blood sugar is low?  Shaky and Weak    What concerns do you have today about your diabetes? None     Do you have any of these symptoms? (Select all that apply)  No numbness or tingling in feet.  No redness, sores or blisters on feet.  No complaints of excessive thirst.  No reports of blurry vision.  No significant changes to weight.    His glucose has been stable anywhere from the 90s-150. He has put on some weight (~10 pounds) since COVID began but has been unable to exercise as much lately due to increasing low back pain. The back pain starting a few months ago without any known fall or injury. The pain has slowly been worsening and is worse with certain movements and especially at night when he is trying to sleep. He is only getting 4-5 hours of sleep at night due to the pain and discomfort. The pain is in the center of his low back and he notices a lot of muscle tightness on both sides of his spine. He denies any radiation of the pain into his legs or any numbness, tingling or loss of bowel/bladder control. He has tried ibuprofen, Tylenol and lidocaine patches without benefit. He recently started physical therapy with home stretching without much relief as of yet. He is wondering if there is something like a muscle relaxer that he can try.    Hyperlipidemia Follow-Up      Are you regularly taking any medication  or supplement to lower your cholesterol?   Yes- Atorvastatin     Are you having muscle aches or other side effects that you think could be caused by your cholesterol lowering medication?  No    Hypertension Follow-up      Do you check your blood pressure regularly outside of the clinic? Yes     Are you following a low salt diet? Yes    Are your blood pressures ever more than 140 on the top number (systolic) OR more   than 90 on the bottom number (diastolic), for example 140/90? No    BP Readings from Last 2 Encounters:   01/29/20 120/78   08/15/19 122/76     Hemoglobin A1C POCT (%)   Date Value   03/05/2021 7.6 (H)   07/29/2020 6.9 (H)     Hemoglobin A1C (%)   Date Value   09/16/2021 6.6 (H)     LDL Cholesterol Calculated (mg/dL)   Date Value   09/16/2021 47   07/29/2020 62   08/15/2019 54       Review of Systems   Constitutional, HEENT, cardiovascular, pulmonary, gi and gu systems are negative, except as otherwise noted.      Objective    Vitals - Patient Reported  Pain Score: No Pain (0)      Vitals:  No vitals were obtained today due to virtual visit.    Physical Exam   GENERAL: Healthy, alert and no distress  EYES: Eyes grossly normal to inspection.  No discharge or erythema, or obvious scleral/conjunctival abnormalities.  RESP: No audible wheeze, cough, or visible cyanosis.  No visible retractions or increased work of breathing.    SKIN: Visible skin clear. No significant rash, abnormal pigmentation or lesions.  NEURO: Cranial nerves grossly intact.  Mentation and speech appropriate for age.  PSYCH: Mentation appears normal, affect normal/bright, judgement and insight intact, normal speech and appearance well-groomed.        Video-Visit Details    Type of service:  Video Visit    Video End Time: 4:23 pm    Originating Location (pt. Location): Home    Distant Location (provider location):  [facility]     Platform used for Video Visit: [employer]

## 2022-03-07 NOTE — PATIENT INSTRUCTIONS
Will try Flexeril to use 3 times daily as needed for muscle spasms. Try at night first as it can make you tired.  I recommend ice and heat along with continued home stretching.  If not improving over the next month, will order imaging.    Come in for labs tomorrow.

## 2022-03-08 ENCOUNTER — LAB (OUTPATIENT)
Dept: LAB | Facility: OTHER | Age: 57
End: 2022-03-08
Payer: COMMERCIAL

## 2022-03-08 DIAGNOSIS — I10 ESSENTIAL HYPERTENSION: ICD-10-CM

## 2022-03-08 DIAGNOSIS — E11.9 TYPE 2 DIABETES MELLITUS WITHOUT COMPLICATION, WITHOUT LONG-TERM CURRENT USE OF INSULIN (H): ICD-10-CM

## 2022-03-08 DIAGNOSIS — N18.2 CHRONIC KIDNEY DISEASE, STAGE 2 (MILD): ICD-10-CM

## 2022-03-08 LAB
ANION GAP SERPL CALCULATED.3IONS-SCNC: 7 MMOL/L (ref 3–14)
BUN SERPL-MCNC: 17 MG/DL (ref 7–30)
CALCIUM SERPL-MCNC: 9.3 MG/DL (ref 8.5–10.1)
CHLORIDE BLD-SCNC: 95 MMOL/L (ref 94–109)
CO2 SERPL-SCNC: 28 MMOL/L (ref 20–32)
CREAT SERPL-MCNC: 1.18 MG/DL (ref 0.66–1.25)
CREAT UR-MCNC: 171 MG/DL
GFR SERPL CREATININE-BSD FRML MDRD: 72 ML/MIN/1.73M2
GLUCOSE BLD-MCNC: 139 MG/DL (ref 70–99)
HBA1C MFR BLD: 7.2 % (ref 0–5.6)
MICROALBUMIN UR-MCNC: 75 MG/L
MICROALBUMIN/CREAT UR: 43.86 MG/G CR (ref 0–17)
POTASSIUM BLD-SCNC: 4.5 MMOL/L (ref 3.4–5.3)
SODIUM SERPL-SCNC: 130 MMOL/L (ref 133–144)

## 2022-03-08 PROCEDURE — 80048 BASIC METABOLIC PNL TOTAL CA: CPT

## 2022-03-08 PROCEDURE — 83036 HEMOGLOBIN GLYCOSYLATED A1C: CPT

## 2022-03-08 PROCEDURE — 82043 UR ALBUMIN QUANTITATIVE: CPT

## 2022-03-08 PROCEDURE — 36415 COLL VENOUS BLD VENIPUNCTURE: CPT

## 2022-08-20 DIAGNOSIS — E11.9 TYPE 2 DIABETES MELLITUS WITHOUT COMPLICATION, WITHOUT LONG-TERM CURRENT USE OF INSULIN (H): ICD-10-CM

## 2022-08-23 RX ORDER — METFORMIN HCL 500 MG
TABLET, EXTENDED RELEASE 24 HR ORAL
Qty: 180 TABLET | Refills: 0 | Status: SHIPPED | OUTPATIENT
Start: 2022-08-23 | End: 2022-09-12

## 2022-08-23 NOTE — TELEPHONE ENCOUNTER
Pending Prescriptions:                       Disp   Refills    metFORMIN (GLUCOPHAGE XR) 500 MG 24 hr ta*180 ta*0            Sig: TAKE TWO TABLETS (1,000 mg) BY MOUTH once daily           with dinner.    Medication is being filled for 1 time lex refill only due to:  Patient is due for med check     Please call and help schedule.  Thank you!

## 2022-09-12 ENCOUNTER — VIRTUAL VISIT (OUTPATIENT)
Dept: FAMILY MEDICINE | Facility: OTHER | Age: 57
End: 2022-09-12
Payer: COMMERCIAL

## 2022-09-12 DIAGNOSIS — N18.2 CHRONIC KIDNEY DISEASE, STAGE 2 (MILD): ICD-10-CM

## 2022-09-12 DIAGNOSIS — I10 ESSENTIAL HYPERTENSION: ICD-10-CM

## 2022-09-12 DIAGNOSIS — E78.5 HYPERLIPIDEMIA LDL GOAL <100: ICD-10-CM

## 2022-09-12 DIAGNOSIS — E11.9 TYPE 2 DIABETES MELLITUS WITHOUT COMPLICATION, WITHOUT LONG-TERM CURRENT USE OF INSULIN (H): Primary | ICD-10-CM

## 2022-09-12 PROCEDURE — 99214 OFFICE O/P EST MOD 30 MIN: CPT | Mod: GT | Performed by: PHYSICIAN ASSISTANT

## 2022-09-12 RX ORDER — METFORMIN HCL 500 MG
TABLET, EXTENDED RELEASE 24 HR ORAL
Qty: 180 TABLET | Refills: 0 | Status: SHIPPED | OUTPATIENT
Start: 2022-09-12 | End: 2022-12-23

## 2022-09-12 RX ORDER — LISINOPRIL AND HYDROCHLOROTHIAZIDE 20; 25 MG/1; MG/1
1 TABLET ORAL DAILY
Qty: 90 TABLET | Refills: 3 | Status: SHIPPED | OUTPATIENT
Start: 2022-09-12 | End: 2023-06-14

## 2022-09-12 NOTE — PROGRESS NOTES
Taiwo is a 57 year old who is being evaluated via a billable video visit.      How would you like to obtain your AVS? MyChart  If the video visit is dropped, the invitation should be resent by: Text to cell phone: [phone]  Will anyone else be joining your video visit? No      Assessment & Plan       ICD-10-CM    1. Type 2 diabetes mellitus without complication, without long-term current use of insulin (H)  E11.9 Hemoglobin A1c     Basic metabolic panel  (Ca, Cl, CO2, Creat, Gluc, K, Na, BUN)     metFORMIN (GLUCOPHAGE XR) 500 MG 24 hr tablet   2. Chronic kidney disease, stage 2 (mild)  N18.2    3. Hyperlipidemia LDL goal <100  E78.5 Lipid panel reflex to direct LDL Fasting   4. Essential hypertension  I10 Basic metabolic panel  (Ca, Cl, CO2, Creat, Gluc, K, Na, BUN)     lisinopril-hydrochlorothiazide (ZESTORETIC) 20-25 MG tablet       1. He will come in for updated labs later this week. Continue current dose of metformin for now and will determine if any changes need to be made depending on lab results. I recommend follow-up in clinic for an annual physical before year end.     2. Continue to work on a low salt diet with infrequent NSAID use.    3. Updated fasting lipid panel to be completed later this week. Continue Lipitor.    4. He states that home BP readings haven been stable. Continue Zestoretic.    Follow-up in 3 months in clinic.         FELICE Acosta [facility]   Taiwo is a 57 year old male, presenting for the following health issues:  No chief complaint on file.      HPI     His back pain is much better compared to this Spring so he has been able to be more active. His glucose in the mornings is staying between 130-140 but is usually lower later in the day. No new concerns today.       Review of Systems   Constitutional, HEENT, cardiovascular, pulmonary, gi and gu systems are negative, except as otherwise noted.      Objective           Vitals:  No vitals  were obtained today due to virtual visit.    Physical Exam   GENERAL: Healthy, alert and no distress  EYES: Eyes grossly normal to inspection.  No discharge or erythema, or obvious scleral/conjunctival abnormalities.  RESP: No audible wheeze, cough, or visible cyanosis.  No visible retractions or increased work of breathing.    SKIN: Visible skin clear. No significant rash, abnormal pigmentation or lesions.  NEURO: Cranial nerves grossly intact.  Mentation and speech appropriate for age.  PSYCH: Mentation appears normal, affect normal/bright, judgement and insight intact, normal speech and appearance well-groomed.            Video-Visit Details    Video Start Time:  3:37 pm    Type of service:  Video Visit    Video End Time: 3:46 pm    Originating Location (pt. Location): Home    Distant Location (provider location):  [facility]     Platform used for Video Visit: [employer]

## 2022-09-16 ENCOUNTER — LAB (OUTPATIENT)
Dept: LAB | Facility: OTHER | Age: 57
End: 2022-09-16
Payer: COMMERCIAL

## 2022-09-16 DIAGNOSIS — E11.9 TYPE 2 DIABETES MELLITUS WITHOUT COMPLICATION, WITHOUT LONG-TERM CURRENT USE OF INSULIN (H): ICD-10-CM

## 2022-09-16 DIAGNOSIS — I10 ESSENTIAL HYPERTENSION: ICD-10-CM

## 2022-09-16 DIAGNOSIS — E78.5 HYPERLIPIDEMIA LDL GOAL <100: ICD-10-CM

## 2022-09-16 LAB
ANION GAP SERPL CALCULATED.3IONS-SCNC: 4 MMOL/L (ref 3–14)
BUN SERPL-MCNC: 23 MG/DL (ref 7–30)
CALCIUM SERPL-MCNC: 9.1 MG/DL (ref 8.5–10.1)
CHLORIDE BLD-SCNC: 104 MMOL/L (ref 94–109)
CHOLEST SERPL-MCNC: 121 MG/DL
CO2 SERPL-SCNC: 28 MMOL/L (ref 20–32)
CREAT SERPL-MCNC: 1.07 MG/DL (ref 0.66–1.25)
FASTING STATUS PATIENT QL REPORTED: YES
GFR SERPL CREATININE-BSD FRML MDRD: 80 ML/MIN/1.73M2
GLUCOSE BLD-MCNC: 125 MG/DL (ref 70–99)
HBA1C MFR BLD: 6.8 % (ref 0–5.6)
HDLC SERPL-MCNC: 46 MG/DL
LDLC SERPL CALC-MCNC: 52 MG/DL
NONHDLC SERPL-MCNC: 75 MG/DL
POTASSIUM BLD-SCNC: 4.8 MMOL/L (ref 3.4–5.3)
SODIUM SERPL-SCNC: 136 MMOL/L (ref 133–144)
TRIGL SERPL-MCNC: 117 MG/DL

## 2022-09-16 PROCEDURE — 36415 COLL VENOUS BLD VENIPUNCTURE: CPT

## 2022-09-16 PROCEDURE — 80061 LIPID PANEL: CPT

## 2022-09-16 PROCEDURE — 83036 HEMOGLOBIN GLYCOSYLATED A1C: CPT

## 2022-09-16 PROCEDURE — 80048 BASIC METABOLIC PNL TOTAL CA: CPT

## 2022-10-09 ENCOUNTER — HEALTH MAINTENANCE LETTER (OUTPATIENT)
Age: 57
End: 2022-10-09

## 2022-12-23 DIAGNOSIS — E11.9 TYPE 2 DIABETES MELLITUS WITHOUT COMPLICATION, WITHOUT LONG-TERM CURRENT USE OF INSULIN (H): ICD-10-CM

## 2022-12-23 RX ORDER — METFORMIN HCL 500 MG
TABLET, EXTENDED RELEASE 24 HR ORAL
Qty: 180 TABLET | Refills: 0 | Status: SHIPPED | OUTPATIENT
Start: 2022-12-23 | End: 2023-02-22

## 2022-12-23 NOTE — TELEPHONE ENCOUNTER
Pending Prescriptions:                       Disp   Refills    metFORMIN (GLUCOPHAGE XR) 500 MG 24 hr ta*180 ta*0            Sig: TAKE TWO TABLETS (1,000 mg) BY MOUTH once daily           with dinner.    Medication is being filled for 1 time lex refill only due to:  Patient is due for 3 month follow up (around 12/12/22).    Please call and help schedule.  Thank you!    Karin Enamorado RN on 12/23/2022 at 3:54 PM

## 2023-02-16 DIAGNOSIS — E78.5 HYPERLIPIDEMIA LDL GOAL <100: ICD-10-CM

## 2023-02-17 RX ORDER — ATORVASTATIN CALCIUM 10 MG/1
TABLET, FILM COATED ORAL
Qty: 90 TABLET | Refills: 0 | Status: SHIPPED | OUTPATIENT
Start: 2023-02-17 | End: 2023-06-14

## 2023-02-17 NOTE — TELEPHONE ENCOUNTER
Pending Prescriptions:                       Disp   Refills    atorvastatin (LIPITOR) 10 MG tablet [Phar*90 tab*0            Sig: TAKE ONE TABLET BY MOUTH once daily    Medication is being filled for 1 time lex refill only due to:  Patient is due for routine visit    Please call and help schedule.  Thank you!

## 2023-02-20 DIAGNOSIS — E11.9 TYPE 2 DIABETES MELLITUS WITHOUT COMPLICATION, WITHOUT LONG-TERM CURRENT USE OF INSULIN (H): ICD-10-CM

## 2023-02-22 RX ORDER — METFORMIN HCL 500 MG
TABLET, EXTENDED RELEASE 24 HR ORAL
Qty: 180 TABLET | Refills: 0 | Status: SHIPPED | OUTPATIENT
Start: 2023-02-22 | End: 2023-06-14

## 2023-03-25 ENCOUNTER — HEALTH MAINTENANCE LETTER (OUTPATIENT)
Age: 58
End: 2023-03-25

## 2023-06-01 ENCOUNTER — TRANSFERRED RECORDS (OUTPATIENT)
Dept: MULTI SPECIALTY CLINIC | Facility: CLINIC | Age: 58
End: 2023-06-01
Payer: COMMERCIAL

## 2023-06-01 LAB — RETINOPATHY: NORMAL

## 2023-06-14 ENCOUNTER — VIRTUAL VISIT (OUTPATIENT)
Dept: FAMILY MEDICINE | Facility: OTHER | Age: 58
End: 2023-06-14
Payer: COMMERCIAL

## 2023-06-14 DIAGNOSIS — I10 ESSENTIAL HYPERTENSION: ICD-10-CM

## 2023-06-14 DIAGNOSIS — N52.9 ERECTILE DYSFUNCTION, UNSPECIFIED ERECTILE DYSFUNCTION TYPE: ICD-10-CM

## 2023-06-14 DIAGNOSIS — E78.5 HYPERLIPIDEMIA LDL GOAL <100: ICD-10-CM

## 2023-06-14 DIAGNOSIS — E11.9 TYPE 2 DIABETES MELLITUS WITHOUT COMPLICATION, WITHOUT LONG-TERM CURRENT USE OF INSULIN (H): Primary | ICD-10-CM

## 2023-06-14 PROCEDURE — 99214 OFFICE O/P EST MOD 30 MIN: CPT | Mod: VID | Performed by: PHYSICIAN ASSISTANT

## 2023-06-14 RX ORDER — METFORMIN HCL 500 MG
1000 TABLET, EXTENDED RELEASE 24 HR ORAL
Qty: 180 TABLET | Refills: 1 | Status: SHIPPED | OUTPATIENT
Start: 2023-06-14 | End: 2023-12-06

## 2023-06-14 RX ORDER — SILDENAFIL CITRATE 20 MG/1
TABLET ORAL
Qty: 18 TABLET | Refills: 3 | Status: SHIPPED | OUTPATIENT
Start: 2023-06-14 | End: 2024-01-04

## 2023-06-14 RX ORDER — LISINOPRIL AND HYDROCHLOROTHIAZIDE 20; 25 MG/1; MG/1
1 TABLET ORAL DAILY
Qty: 90 TABLET | Refills: 3 | Status: SHIPPED | OUTPATIENT
Start: 2023-06-14 | End: 2024-06-03

## 2023-06-14 RX ORDER — ATORVASTATIN CALCIUM 10 MG/1
10 TABLET, FILM COATED ORAL DAILY
Qty: 90 TABLET | Refills: 3 | Status: SHIPPED | OUTPATIENT
Start: 2023-06-14 | End: 2024-06-03

## 2023-06-14 NOTE — PATIENT INSTRUCTIONS
Continue current medications.    Come in for labs and vital signs tomorrow.    Follow-up in 6 months.

## 2023-06-14 NOTE — PROGRESS NOTES
Taiwo is a 57 year old who is being evaluated via a billable video visit.      How would you like to obtain your AVS? MyChart  If the video visit is dropped, the invitation should be resent by: Text to cell phone: [phone]  Will anyone else be joining your video visit? No  Assessment & Plan       ICD-10-CM    1. Type 2 diabetes mellitus without complication, without long-term current use of insulin (H)  E11.9 Albumin Random Urine Quantitative with Creat Ratio     Hemoglobin A1c     metFORMIN (GLUCOPHAGE XR) 500 MG 24 hr tablet      2. Hyperlipidemia LDL goal <100  E78.5 atorvastatin (LIPITOR) 10 MG tablet      3. Essential hypertension  I10 lisinopril-hydrochlorothiazide (ZESTORETIC) 20-25 MG tablet      4. Erectile dysfunction, unspecified erectile dysfunction type  N52.9 sildenafil (REVATIO) 20 MG tablet          1. He will come in for an updated A1c and microalbumin tomorrow. Continue metformin. He is taking it with dinner although it is now always at the same time each evening. As long as his glucose reading are not increasing, it is okay if it is not taken at the same time each day. Recheck in 6 months in clinic for annual physical.    2. Continue Lipitor.    3. Continue Zestoretic. He will come in tomorrow for a nursing BP/vitals check since we do not have any recent vitals on file.    4. Continue sildenafil.    Follow-up in 6 months.     FELICE [facility]osta [facility]   Taiwo is a 57 year old, presenting for the following health issues:  Recheck Medication and Diabetes        6/14/2023     7:12 AM   Additional Questions   Roomed by Gordo TREJO   Accompanied by Self         6/14/2023     7:12 AM   Patient Reported Additional Medications   Patient reports taking the following new medications None     History of Present Illness       Diabetes:   He presents for follow up of diabetes.  He is checking home blood glucose a few times a week. He checks blood glucose before  meals.  Blood glucose is never over 200 and never under 70. When his blood glucose is low, the patient is asymptomatic for confusion, blurred vision, lethargy and reports not feeling dizzy, shaky, or weak.  He has no concerns regarding his diabetes at this time.  He is not experiencing numbness or burning in feet, excessive thirst, blurry vision, weight changes or redness, sores or blisters on feet. The patient has had a diabetic eye exam in the last 12 months. Eye exam performed on June, 2023. Location of last eye exam Dr. Liberty Swartz.        He eats 2-3 servings of fruits and vegetables daily.He consumes 0 sweetened beverage(s) daily.He exercises with enough effort to increase his heart rate 20 to 29 minutes per day.  He exercises with enough effort to increase his heart rate 4 days per week.   He is taking medications regularly.       He overall feels very good. His glucose is usually around 130 in the mornings and improved later in the day. He has been slowly losing weight as his back pain from last year is much improved so he has been more active. No new concerns today.       Review of Systems   Constitutional, HEENT, cardiovascular, pulmonary, gi and gu systems are negative, except as otherwise noted.      Objective         Vitals:  No vitals were obtained today due to virtual visit.    Physical Exam   GENERAL: Healthy, alert and no distress  EYES: Eyes grossly normal to inspection.  No discharge or erythema, or obvious scleral/conjunctival abnormalities.  RESP: No audible wheeze, cough, or visible cyanosis.  No visible retractions or increased work of breathing.    SKIN: Visible skin clear. No significant rash, abnormal pigmentation or lesions.  NEURO: Cranial nerves grossly intact.  Mentation and speech appropriate for age.  PSYCH: Mentation appears normal, affect normal/bright, judgement and insight intact, normal speech and appearance well-groomed.        Video-Visit Details    Type of service:   Video Visit     Originating Location (pt. Location): Home  Distant Location (provider location):  Off-site  Platform used for Video Visit: Mary

## 2023-06-15 ENCOUNTER — LAB (OUTPATIENT)
Dept: LAB | Facility: OTHER | Age: 58
End: 2023-06-15
Payer: COMMERCIAL

## 2023-06-15 ENCOUNTER — ALLIED HEALTH/NURSE VISIT (OUTPATIENT)
Dept: FAMILY MEDICINE | Facility: OTHER | Age: 58
End: 2023-06-15
Payer: COMMERCIAL

## 2023-06-15 VITALS
HEART RATE: 68 BPM | RESPIRATION RATE: 12 BRPM | HEIGHT: 71 IN | BODY MASS INDEX: 31.57 KG/M2 | DIASTOLIC BLOOD PRESSURE: 78 MMHG | OXYGEN SATURATION: 100 % | WEIGHT: 225.5 LBS | TEMPERATURE: 97.6 F | SYSTOLIC BLOOD PRESSURE: 122 MMHG

## 2023-06-15 DIAGNOSIS — E11.9 TYPE 2 DIABETES MELLITUS WITHOUT COMPLICATION, WITHOUT LONG-TERM CURRENT USE OF INSULIN (H): ICD-10-CM

## 2023-06-15 DIAGNOSIS — E11.9 TYPE 2 DIABETES MELLITUS WITHOUT COMPLICATION, WITHOUT LONG-TERM CURRENT USE OF INSULIN (H): Primary | ICD-10-CM

## 2023-06-15 DIAGNOSIS — I10 ESSENTIAL HYPERTENSION: ICD-10-CM

## 2023-06-15 LAB
CREAT UR-MCNC: 111.7 MG/DL
HBA1C MFR BLD: 6.9 % (ref 0–5.6)
MICROALBUMIN UR-MCNC: 27.4 MG/L
MICROALBUMIN/CREAT UR: 24.53 MG/G CR (ref 0–17)

## 2023-06-15 PROCEDURE — 36415 COLL VENOUS BLD VENIPUNCTURE: CPT

## 2023-06-15 PROCEDURE — 83036 HEMOGLOBIN GLYCOSYLATED A1C: CPT

## 2023-06-15 PROCEDURE — 99207 PR NO CHARGE NURSE ONLY: CPT

## 2023-06-15 PROCEDURE — 82570 ASSAY OF URINE CREATININE: CPT

## 2023-06-15 PROCEDURE — 82043 UR ALBUMIN QUANTITATIVE: CPT

## 2023-06-15 ASSESSMENT — PAIN SCALES - GENERAL: PAINLEVEL: NO PAIN (0)

## 2023-06-15 NOTE — NURSING NOTE
"Taiwo Escobar is a 57 year old patient who comes in today for a vitals check with a Visit Facilitator because of recent virtual visit with no recent vitals on file.  /78 (BP Location: Right arm, Patient Position: Sitting, Cuff Size: Adult Large)   Pulse 68   Temp 97.6  F (36.4  C) (Oral)   Resp 12   Ht 1.796 m (5' 10.71\")   Wt 102.3 kg (225 lb 8 oz)   SpO2 100%   BMI 31.71 kg/m       Disposition: results routed to provider    Kendal Vaughn, VF            "

## 2023-12-06 DIAGNOSIS — E11.9 TYPE 2 DIABETES MELLITUS WITHOUT COMPLICATION, WITHOUT LONG-TERM CURRENT USE OF INSULIN (H): ICD-10-CM

## 2023-12-06 RX ORDER — METFORMIN HCL 500 MG
1000 TABLET, EXTENDED RELEASE 24 HR ORAL
Qty: 180 TABLET | Refills: 0 | Status: SHIPPED | OUTPATIENT
Start: 2023-12-06 | End: 2024-01-04

## 2024-01-04 ENCOUNTER — OFFICE VISIT (OUTPATIENT)
Dept: FAMILY MEDICINE | Facility: OTHER | Age: 59
End: 2024-01-04
Payer: COMMERCIAL

## 2024-01-04 VITALS
BODY MASS INDEX: 31.71 KG/M2 | TEMPERATURE: 98.1 F | HEART RATE: 82 BPM | HEIGHT: 71 IN | RESPIRATION RATE: 14 BRPM | SYSTOLIC BLOOD PRESSURE: 114 MMHG | WEIGHT: 226.5 LBS | DIASTOLIC BLOOD PRESSURE: 78 MMHG | OXYGEN SATURATION: 98 %

## 2024-01-04 DIAGNOSIS — Z23 NEED FOR TDAP VACCINATION: ICD-10-CM

## 2024-01-04 DIAGNOSIS — I10 ESSENTIAL HYPERTENSION: ICD-10-CM

## 2024-01-04 DIAGNOSIS — Z00.00 ENCOUNTER FOR ROUTINE ADULT HEALTH EXAMINATION WITHOUT ABNORMAL FINDINGS: Primary | ICD-10-CM

## 2024-01-04 DIAGNOSIS — E78.5 HYPERLIPIDEMIA LDL GOAL <100: ICD-10-CM

## 2024-01-04 DIAGNOSIS — N52.9 ERECTILE DYSFUNCTION, UNSPECIFIED ERECTILE DYSFUNCTION TYPE: ICD-10-CM

## 2024-01-04 DIAGNOSIS — E11.9 TYPE 2 DIABETES MELLITUS WITHOUT COMPLICATION, WITHOUT LONG-TERM CURRENT USE OF INSULIN (H): ICD-10-CM

## 2024-01-04 LAB
ANION GAP SERPL CALCULATED.3IONS-SCNC: 11 MMOL/L (ref 7–15)
BUN SERPL-MCNC: 19.3 MG/DL (ref 8–23)
CALCIUM SERPL-MCNC: 9.6 MG/DL (ref 8.8–10.2)
CHLORIDE SERPL-SCNC: 98 MMOL/L (ref 98–107)
CHOLEST SERPL-MCNC: 140 MG/DL
CREAT SERPL-MCNC: 1.28 MG/DL (ref 0.67–1.17)
DEPRECATED HCO3 PLAS-SCNC: 28 MMOL/L (ref 22–29)
EGFRCR SERPLBLD CKD-EPI 2021: 64 ML/MIN/1.73M2
FASTING STATUS PATIENT QL REPORTED: YES
GLUCOSE SERPL-MCNC: 170 MG/DL (ref 70–99)
HBA1C MFR BLD: 7.2 % (ref 0–5.6)
HDLC SERPL-MCNC: 47 MG/DL
LDLC SERPL CALC-MCNC: 63 MG/DL
NONHDLC SERPL-MCNC: 93 MG/DL
POTASSIUM SERPL-SCNC: 3.9 MMOL/L (ref 3.4–5.3)
SODIUM SERPL-SCNC: 137 MMOL/L (ref 135–145)
TRIGL SERPL-MCNC: 150 MG/DL

## 2024-01-04 PROCEDURE — 90471 IMMUNIZATION ADMIN: CPT | Performed by: PHYSICIAN ASSISTANT

## 2024-01-04 PROCEDURE — 36415 COLL VENOUS BLD VENIPUNCTURE: CPT | Performed by: PHYSICIAN ASSISTANT

## 2024-01-04 PROCEDURE — 90715 TDAP VACCINE 7 YRS/> IM: CPT | Performed by: PHYSICIAN ASSISTANT

## 2024-01-04 PROCEDURE — 99396 PREV VISIT EST AGE 40-64: CPT | Mod: 25 | Performed by: PHYSICIAN ASSISTANT

## 2024-01-04 PROCEDURE — 80048 BASIC METABOLIC PNL TOTAL CA: CPT | Performed by: PHYSICIAN ASSISTANT

## 2024-01-04 PROCEDURE — 83036 HEMOGLOBIN GLYCOSYLATED A1C: CPT | Performed by: PHYSICIAN ASSISTANT

## 2024-01-04 PROCEDURE — 80061 LIPID PANEL: CPT | Performed by: PHYSICIAN ASSISTANT

## 2024-01-04 PROCEDURE — 99207 PR FOOT EXAM NO CHARGE: CPT | Performed by: PHYSICIAN ASSISTANT

## 2024-01-04 PROCEDURE — 99214 OFFICE O/P EST MOD 30 MIN: CPT | Mod: 25 | Performed by: PHYSICIAN ASSISTANT

## 2024-01-04 RX ORDER — METFORMIN HCL 500 MG
1000 TABLET, EXTENDED RELEASE 24 HR ORAL
Qty: 180 TABLET | Refills: 1 | Status: SHIPPED | OUTPATIENT
Start: 2024-01-04 | End: 2024-08-29

## 2024-01-04 RX ORDER — SILDENAFIL CITRATE 20 MG/1
TABLET ORAL
Qty: 18 TABLET | Refills: 3 | Status: SHIPPED | OUTPATIENT
Start: 2024-01-04 | End: 2024-09-23

## 2024-01-04 ASSESSMENT — ENCOUNTER SYMPTOMS
WEAKNESS: 0
HEADACHES: 0
FEVER: 0
SHORTNESS OF BREATH: 0
CHILLS: 0
HEARTBURN: 0
CONSTIPATION: 0
NERVOUS/ANXIOUS: 0
PALPITATIONS: 0
JOINT SWELLING: 0
ARTHRALGIAS: 0
DIZZINESS: 0
EYE PAIN: 0
FREQUENCY: 0
HEMATOCHEZIA: 0
MYALGIAS: 0
DIARRHEA: 0
NAUSEA: 0
PARESTHESIAS: 0
HEMATURIA: 0
DYSURIA: 0
SORE THROAT: 0
ABDOMINAL PAIN: 0
COUGH: 0

## 2024-01-04 ASSESSMENT — PAIN SCALES - GENERAL: PAINLEVEL: NO PAIN (0)

## 2024-01-04 NOTE — PATIENT INSTRUCTIONS
Preventive Health Recommendations  Male Ages 50 - 64    Yearly exam:             See your health care provider every year in order to  o   Review health changes.   o   Discuss preventive care.    o   Review your medicines if your doctor has prescribed any.   Have a cholesterol test every 5 years, or more frequently if you are at risk for high cholesterol/heart disease.   Have a diabetes test (fasting glucose) every three years. If you are at risk for diabetes, you should have this test more often.   Have a colonoscopy at age 45, or have a yearly FIT test (stool test). These exams will check for colon cancer.    Talk with your health care provider about whether or not a prostate cancer screening test (PSA) is right for you.  You should be tested each year for STDs (sexually transmitted diseases), if you re at risk.     Shots: Get a flu shot each year. Get a tetanus shot every 10 years.     Nutrition:  Eat at least 5 servings of fruits and vegetables daily.   Eat whole-grain bread, whole-wheat pasta and brown rice instead of white grains and rice.   Get adequate Calcium and Vitamin D.     Lifestyle  Exercise for at least 150 minutes a week (30 minutes a day, 5 days a week). This will help you control your weight and prevent disease.   Limit alcohol to one drink per day.   No smoking.   Wear sunscreen to prevent skin cancer.   See your dentist every six months for an exam and cleaning.   See your eye doctor every 1 to 2 years.    A1c has slightly increased to 7.2.    Continue to work on a healthier diet with more routine exercise.    Follow-up in 6 months.

## 2024-01-04 NOTE — PROGRESS NOTES
SUBJECTIVE:   Taiwo is a 58 year old, presenting for the following:  Physical        1/4/2024     8:26 AM   Additional Questions   Roomed by Hawa   Accompanied by Self         1/4/2024     8:26 AM   Patient Reported Additional Medications   Patient reports taking the following new medications none       Healthy Habits:     Getting at least 3 servings of Calcium per day:  Yes    Bi-annual eye exam:  Yes    Dental care twice a year:  Yes    Sleep apnea or symptoms of sleep apnea:  None    Diet:  Low salt and Diabetic    Frequency of exercise:  2-3 days/week    Duration of exercise:  30-45 minutes    Taking medications regularly:  Yes    Medication side effects:  None    Additional concerns today:  No      Diabetes Follow-up    How often are you checking your blood sugar? Two times a week in the morning/afternoon or if he feels odd  What concerns do you have today about your diabetes? None    Do you have any of these symptoms? (Select all that apply)  No numbness or tingling in feet.  No redness, sores or blisters on feet.  No complaints of excessive thirst.  No reports of blurry vision.  No significant changes to weight.    No new concerns today. He overall feels things are going well.       Hyperlipidemia Follow-Up    Are you regularly taking any medication or supplement to lower your cholesterol?   Yes- atorvastatin 10 mg  Are you having muscle aches or other side effects that you think could be caused by your cholesterol lowering medication?  No    Hypertension Follow-up    Do you check your blood pressure regularly outside of the clinic? Yes   Are you following a low salt diet? Yes  Are your blood pressures ever more than 140 on the top number (systolic) OR more   than 90 on the bottom number (diastolic), for example 140/90? No    BP Readings from Last 2 Encounters:   01/04/24 114/78   06/15/23 122/78     Hemoglobin A1C (%)   Date Value   01/04/2024 7.2 (H)   06/15/2023 6.9 (H)   03/05/2021 7.6 (H)   07/29/2020  6.9 (H)     LDL Cholesterol Calculated (mg/dL)   Date Value   09/16/2022 52   09/16/2021 47   07/29/2020 62   08/15/2019 54       Have you ever done Advance Care Planning? (For example, a Health Directive, POLST, or a discussion with a medical provider or your loved ones about your wishes): No, advance care planning information given to patient to review.  Patient plans to discuss their wishes with loved ones or provider.      Social History     Tobacco Use    Smoking status: Never    Smokeless tobacco: Never   Substance Use Topics    Alcohol use: Yes     Comment: a few days per week         1/4/2024     7:39 AM   Alcohol Use   Prescreen: >3 drinks/day or >7 drinks/week? No       Last PSA:   PSA   Date Value Ref Range Status   11/21/2016 0.52 0 - 4 ug/L Final     Comment:     Assay Method:  Chemiluminescence using Siemens Vista analyzer       Reviewed orders with patient. Reviewed health maintenance and updated orders accordingly - Yes      Reviewed and updated as needed this visit by clinical staff   Tobacco  Allergies  Meds  Problems  Med Hx  Surg Hx  Fam Hx          Reviewed and updated as needed this visit by Provider   Tobacco  Allergies  Meds  Problems  Med Hx  Surg Hx  Fam Hx           Review of Systems   Constitutional:  Negative for chills and fever.   HENT:  Negative for congestion, ear pain, hearing loss and sore throat.    Eyes:  Negative for pain and visual disturbance.   Respiratory:  Negative for cough and shortness of breath.    Cardiovascular:  Negative for chest pain, palpitations and peripheral edema.   Gastrointestinal:  Negative for abdominal pain, constipation, diarrhea, heartburn, hematochezia and nausea.   Genitourinary:  Positive for impotence. Negative for dysuria, frequency, genital sores, hematuria, penile discharge and urgency.   Musculoskeletal:  Negative for arthralgias, joint swelling and myalgias.   Skin:  Negative for rash.   Neurological:  Negative for dizziness,  "weakness, headaches and paresthesias.   Psychiatric/Behavioral:  Negative for mood changes. The patient is not nervous/anxious.        OBJECTIVE:   /78   Pulse 82   Temp 98.1  F (36.7  C) (Temporal)   Resp 14   Ht 1.796 m (5' 10.7\")   Wt 102.7 kg (226 lb 8 oz)   SpO2 98%   BMI 31.86 kg/m      Physical Exam  GENERAL: healthy, alert and no distress  EYES: Eyes grossly normal to inspection, PERRL and conjunctivae and sclerae normal  HENT: ear canals and TM's normal, nose and mouth without ulcers or lesions  NECK: no adenopathy, no asymmetry, masses, or scars and thyroid normal to palpation  RESP: lungs clear to auscultation - no rales, rhonchi or wheezes  CV: regular rate and rhythm, normal S1 S2, no S3 or S4, no murmur, click or rub, no peripheral edema and peripheral pulses strong  ABDOMEN: soft, nontender, no hepatosplenomegaly, no masses and bowel sounds normal   (male): normal male circumcised genitalia without lesions or urethral discharge, no hernia  MS: no gross musculoskeletal defects noted, no edema. FROM to all extremities. No spinal tenderness.   SKIN: no suspicious lesions or rashes. Erythematous cheeks and nose with telangiectasias.   NEURO: Normal strength and tone, mentation intact and speech normal. Cranial nerves II-XII are grossly intact. DTRs are 2+/4 throughout and symmetric. Gait is stable.  PSYCH: mentation appears normal, affect normal/bright    Lab Results   Component Value Date    A1C 7.2 01/04/2024    A1C 6.9 06/15/2023    A1C 6.8 09/16/2022    A1C 7.2 03/08/2022    A1C 6.6 09/16/2021    A1C 7.6 03/05/2021    A1C 6.9 07/29/2020    A1C 6.6 01/29/2020    A1C 6.3 08/15/2019    A1C 6.5 12/20/2018       ASSESSMENT/PLAN:       ICD-10-CM    1. Encounter for routine adult health examination without abnormal findings  Z00.00       2. Type 2 diabetes mellitus without complication, without long-term current use of insulin (H)  E11.9 FOOT EXAM     Basic metabolic panel  (Ca, Cl, CO2, " "Creat, Gluc, K, Na, BUN)     Hemoglobin A1c     Basic metabolic panel  (Ca, Cl, CO2, Creat, Gluc, K, Na, BUN)     Hemoglobin A1c     metFORMIN (GLUCOPHAGE XR) 500 MG 24 hr tablet      3. Essential hypertension  I10 Basic metabolic panel  (Ca, Cl, CO2, Creat, Gluc, K, Na, BUN)     Basic metabolic panel  (Ca, Cl, CO2, Creat, Gluc, K, Na, BUN)      4. Hyperlipidemia LDL goal <100  E78.5 Lipid panel reflex to direct LDL Fasting     Lipid panel reflex to direct LDL Fasting      5. Erectile dysfunction, unspecified erectile dysfunction type  N52.9 sildenafil (REVATIO) 20 MG tablet      6. Need for Tdap vaccination  Z23 TDAP 10-64Y (ADACEL,BOOSTRIX)          2. His A1c has slightly worsened to 7.2. He will focus on a healthier, low carb diet with more routine exercise. Will not make any medications changes at this time. Will recheck in 6 months.    3. BP is stable on Zestoretic.    4. Updated lipid panel ordered. Continue Lipitor.    5. Will refill sildenafil to continue as needed.    6. Vaccine administered by MA.    Follow-up in 6 months.         Patient has been advised of split billing requirements and indicates understanding: Yes      COUNSELING:   Reviewed preventive health counseling, as reflected in patient instructions       Regular exercise       Healthy diet/nutrition      BMI:   Estimated body mass index is 31.86 kg/m  as calculated from the following:    Height as of this encounter: 1.796 m (5' 10.7\").    Weight as of this encounter: 102.7 kg (226 lb 8 oz).   Weight management plan: Discussed healthy diet and exercise guidelines      He reports that he has never smoked. He has never used smokeless tobacco.        Moises Bell PA-C  [facility]  "

## 2024-01-04 NOTE — PROGRESS NOTES
Prior to immunization administration, verified patients identity using patient s name and date of birth. Please see Immunization Activity for additional information.     Screening Questionnaire for Adult Immunization    Are you sick today?   No   Do you have allergies to medications, food, a vaccine component or latex?   No   Have you ever had a serious reaction after receiving a vaccination?   No   Do you have a long-term health problem with heart, lung, kidney, or metabolic disease (e.g., diabetes), asthma, a blood disorder, no spleen, complement component deficiency, a cochlear implant, or a spinal fluid leak?  Are you on long-term aspirin therapy?   No   Do you have cancer, leukemia, HIV/AIDS, or any other immune system problem?   No   Do you have a parent, brother, or sister with an immune system problem?   No   In the past 3 months, have you taken medications that affect  your immune system, such as prednisone, other steroids, or anticancer drugs; drugs for the treatment of rheumatoid arthritis, Crohn s disease, or psoriasis; or have you had radiation treatments?   No   Have you had a seizure, or a brain or other nervous system problem?   No   During the past year, have you received a transfusion of blood or blood    products, or been given immune (gamma) globulin or antiviral drug?   No   For women: Are you pregnant or is there a chance you could become       pregnant during the next month?   No   Have you received any vaccinations in the past 4 weeks?   No     Immunization questionnaire answers were all negative.      Patient instructed to remain in clinic for 15 minutes afterwards, and to report any adverse reactions.     Screening performed by Magalis Costa CMA on 1/4/2024 at 9:23 AM.

## 2024-06-02 DIAGNOSIS — E78.5 HYPERLIPIDEMIA LDL GOAL <100: ICD-10-CM

## 2024-06-02 DIAGNOSIS — I10 ESSENTIAL HYPERTENSION: ICD-10-CM

## 2024-06-03 RX ORDER — LISINOPRIL AND HYDROCHLOROTHIAZIDE 20; 25 MG/1; MG/1
1 TABLET ORAL DAILY
Qty: 90 TABLET | Refills: 1 | Status: SHIPPED | OUTPATIENT
Start: 2024-06-03 | End: 2024-09-23

## 2024-06-03 RX ORDER — ATORVASTATIN CALCIUM 10 MG/1
10 TABLET, FILM COATED ORAL DAILY
Qty: 90 TABLET | Refills: 1 | Status: SHIPPED | OUTPATIENT
Start: 2024-06-03 | End: 2024-09-23

## 2024-06-25 ENCOUNTER — TELEPHONE (OUTPATIENT)
Dept: FAMILY MEDICINE | Facility: OTHER | Age: 59
End: 2024-06-25

## 2024-06-25 NOTE — TELEPHONE ENCOUNTER
Patient Quality Outreach    Patient is due for the following:   Diabetes -  Eye Exam, Microalbumin, and Diabetic Follow-Up Visit    Next Steps:   Schedule a office visit for Diabetes and labs. Also schedule Diabetic Eye exam with local eye clinic.     Type of outreach:    Sent [employer] message.      Questions for provider review:    None           Magalis Lawson, [facility]  Chart routed to Care Team.

## 2024-06-25 NOTE — LETTER
July 31, 2024      Taiwo Escobar  [address] [place] [id number]        Dear Taiwo,          Our records indicate that you are due for a Medication Check (Diabetes) with your Primary Care Provider. You are also due to have a diabetic eye exam with your local eye clinic if you have not in the last year. Please have eye exam results faxed to our clinic at [phone] to be added to your medical records.      Our records also indicate that you are due for some immunizations, labs and/or preventative screenings.      Please schedule an appointment via [employer] (by [employer]) or by calling the clinic at [phone], and asking to speak to a member of your care team.          To Schedule an Appointment via [employer] (by [employer]) -      1) Click the Visits tab (located on the top left) and select the option to Schedule an Appointment  2) Choose the department you would like to schedule in and respond to any additional questions that populate.  (Note: There are also options for you to indicate preferred clinic locations and providers)  3) Verify your Personal Information by clicking This Information Is Correct or Edit if the information is not correct  4) Review the insurance information on file and if current, select This Information Is Correct              If the information is not correct, you can select options to remove or update your coverage.  5) Where prompted, please specify the reason for your appointment and select Schedule     After the appointment has been scheduled, you will see a confirmation with all the details for your upcoming visit.        Take Care,  Your [facility] Patient Care Team     [facility]  Phone: [phone]  Fax: [phone]

## 2024-07-31 NOTE — TELEPHONE ENCOUNTER
Patient Quality Outreach    Patient is due for the following:   Diabetes -  Eye Exam, Microalbumin, and Diabetic Follow-Up Visit    Next Steps:   Schedule a office visit for Diabetes    Type of outreach:    Sent letter.    Next Steps:  Reach out within 90 days via [employer].    Max number of attempts reached: Yes. Will try again in 90 days if patient still on fail list.    Questions for provider review:    None           Magalis Lawson, CMA

## 2024-08-03 ENCOUNTER — HEALTH MAINTENANCE LETTER (OUTPATIENT)
Age: 59
End: 2024-08-03

## 2024-08-29 DIAGNOSIS — E11.9 TYPE 2 DIABETES MELLITUS WITHOUT COMPLICATION, WITHOUT LONG-TERM CURRENT USE OF INSULIN (H): ICD-10-CM

## 2024-08-29 RX ORDER — METFORMIN HCL 500 MG
1000 TABLET, EXTENDED RELEASE 24 HR ORAL
Qty: 120 TABLET | Refills: 0 | Status: SHIPPED | OUTPATIENT
Start: 2024-08-29 | End: 2024-09-24

## 2024-09-23 ENCOUNTER — VIRTUAL VISIT (OUTPATIENT)
Dept: FAMILY MEDICINE | Facility: OTHER | Age: 59
End: 2024-09-23
Payer: COMMERCIAL

## 2024-09-23 DIAGNOSIS — Z12.5 SCREENING FOR PROSTATE CANCER: ICD-10-CM

## 2024-09-23 DIAGNOSIS — E78.5 HYPERLIPIDEMIA LDL GOAL <100: ICD-10-CM

## 2024-09-23 DIAGNOSIS — I10 ESSENTIAL HYPERTENSION: ICD-10-CM

## 2024-09-23 DIAGNOSIS — E11.9 TYPE 2 DIABETES MELLITUS WITHOUT COMPLICATION, WITHOUT LONG-TERM CURRENT USE OF INSULIN (H): Primary | ICD-10-CM

## 2024-09-23 DIAGNOSIS — N52.9 ERECTILE DYSFUNCTION, UNSPECIFIED ERECTILE DYSFUNCTION TYPE: ICD-10-CM

## 2024-09-23 PROCEDURE — G2211 COMPLEX E/M VISIT ADD ON: HCPCS | Mod: 95 | Performed by: PHYSICIAN ASSISTANT

## 2024-09-23 PROCEDURE — 99214 OFFICE O/P EST MOD 30 MIN: CPT | Mod: 95 | Performed by: PHYSICIAN ASSISTANT

## 2024-09-23 RX ORDER — LISINOPRIL AND HYDROCHLOROTHIAZIDE 20; 25 MG/1; MG/1
1 TABLET ORAL DAILY
Qty: 90 TABLET | Refills: 3 | Status: SHIPPED | OUTPATIENT
Start: 2024-09-23

## 2024-09-23 RX ORDER — ATORVASTATIN CALCIUM 10 MG/1
10 TABLET, FILM COATED ORAL DAILY
Qty: 90 TABLET | Refills: 3 | Status: SHIPPED | OUTPATIENT
Start: 2024-09-23

## 2024-09-23 RX ORDER — SILDENAFIL CITRATE 20 MG/1
TABLET ORAL
Qty: 18 TABLET | Refills: 3 | Status: SHIPPED | OUTPATIENT
Start: 2024-09-23

## 2024-09-23 NOTE — PROGRESS NOTES
Taiwo is a 59 year old who is being evaluated via a billable video visit.    How would you like to obtain your AVS? MyChart  If the video visit is dropped, the invitation should be resent by: Text to cell phone: [phone]  Will anyone else be joining your video visit? No    Assessment & Plan       ICD-10-CM    1. Type 2 diabetes mellitus without complication, without long-term current use of insulin (H)  E11.9 Albumin Random Urine Quantitative with Creat Ratio     Hemoglobin A1c      2. Screening for prostate cancer  Z12.5 PSA, screen      3. Essential hypertension  I10 lisinopril-hydrochlorothiazide (ZESTORETIC) 20-25 MG tablet      4. Hyperlipidemia LDL goal <100  E78.5 atorvastatin (LIPITOR) 10 MG tablet      5. Erectile dysfunction, unspecified erectile dysfunction type  N52.9 sildenafil (REVATIO) 20 MG tablet            Due for repeat A1C. Order placed for lab draw- he will come in tomorrow for draw. Continue metformin and will consider increase in dose if needed. Follow-up in 3-6 months depending on lab results.     2. PSA ordered.     3. Recent home blood pressures normal. Continue current plan.     4. Continue atorvastatin. Will place order for lipid panel.     5. Sildenafil successful at managing symptoms - refills sent.     The longitudinal plan of care for the diagnosis(es)/condition(s) as documented were addressed during this visit. Due to the added complexity in care, I will continue to support Taiwo in the subsequent management and with ongoing continuity of care.    Subjective   Taiwo is a 59 year old, presenting for the following health issues:  No chief complaint on file.      Via the Health Maintenance questionnaire, the patient has reported the following services have been completed -Eye Exam: [facility] 2023-11-20, this information has been sent to the abstraction team.  History of HLD, HTN, and T2DM, here via virtual visit for Medication refills: Feels that his health has been  going well. Feeling like he is being more active recently as he recently moved. He has been checking his sugars a few days a week and usually notes his sugars around 110s-135, usually higher in the morning and lower in the evening. He continues to watch his diet and has cut out sugary snacks and drinks. He denies any episodes of low sugars.     History of Present Illness       Diabetes:   He presents for follow up of diabetes.  He is checking home blood glucose a few times a week.   He checks blood glucose before meals.  Blood glucose is never over 200 and never under 70.  When his blood glucose is low, the patient is asymptomatic for confusion, blurred vision, lethargy and reports not feeling dizzy, shaky, or weak.   He has no concerns regarding his diabetes at this time.   He is not experiencing numbness or burning in feet, excessive thirst, blurry vision, weight changes or redness, sores or blisters on feet. The patient has had a diabetic eye exam in the last 12 months. Eye exam performed on 11/23. Location of last eye exam [facility].        Hyperlipidemia:  He presents for follow up of hyperlipidemia.   He is taking medication to lower cholesterol. He is not having myalgia or other side effects to statin medications.    Hypertension: He presents for follow up of hypertension.  He does check blood pressure  regularly outside of the clinic. Outpatient blood pressures have not been over 140/90. He follows a low salt diet.     He eats 2-3 servings of fruits and vegetables daily.He consumes 0 sweetened beverage(s) daily.He exercises with enough effort to increase his heart rate 30 to 60 minutes per day.  He exercises with enough effort to increase his heart rate 4 days per week.   He is taking medications regularly.       Review of Systems  Constitutional, HEENT, cardiovascular, pulmonary, gi and gu systems are negative, except as otherwise noted.      Objective         Vitals:  No vitals were obtained  today due to virtual visit.    Physical Exam   GENERAL: alert and no distress  EYES: Eyes grossly normal to inspection.  No discharge or erythema, or obvious scleral/conjunctival abnormalities.  RESP: No audible wheeze, cough, or visible cyanosis.    SKIN: Visible skin clear. No significant rash, abnormal pigmentation or lesions.  NEURO: Cranial nerves grossly intact. Mentation and speech appropriate for age.  PSYCH: Appropriate affect, tone, and pace of words        Video-Visit Details    Type of service:  Video Visit   Originating Location (pt. Location): Home  Distant Location (provider location):  Off-site  Platform used for Video Visit: Mary    Seen in conjunction with Jake [email or web address]  Signed Electronically by: GIGI AcostaC

## 2024-09-24 ENCOUNTER — LAB (OUTPATIENT)
Dept: LAB | Facility: OTHER | Age: 59
End: 2024-09-24
Payer: COMMERCIAL

## 2024-09-24 DIAGNOSIS — E11.9 TYPE 2 DIABETES MELLITUS WITHOUT COMPLICATION, WITHOUT LONG-TERM CURRENT USE OF INSULIN (H): ICD-10-CM

## 2024-09-24 DIAGNOSIS — Z12.5 SCREENING FOR PROSTATE CANCER: ICD-10-CM

## 2024-09-24 LAB
CREAT UR-MCNC: 99.8 MG/DL
EST. AVERAGE GLUCOSE BLD GHB EST-MCNC: 169 MG/DL
HBA1C MFR BLD: 7.5 % (ref 0–5.6)
MICROALBUMIN UR-MCNC: 46.4 MG/L
MICROALBUMIN/CREAT UR: 46.49 MG/G CR (ref 0–17)
PSA SERPL DL<=0.01 NG/ML-MCNC: 0.28 NG/ML (ref 0–4.5)

## 2024-09-24 PROCEDURE — 82043 UR ALBUMIN QUANTITATIVE: CPT

## 2024-09-24 PROCEDURE — 82570 ASSAY OF URINE CREATININE: CPT

## 2024-09-24 PROCEDURE — G0103 PSA SCREENING: HCPCS

## 2024-09-24 PROCEDURE — 83036 HEMOGLOBIN GLYCOSYLATED A1C: CPT

## 2024-09-24 PROCEDURE — 36415 COLL VENOUS BLD VENIPUNCTURE: CPT

## 2024-09-24 RX ORDER — METFORMIN HCL 500 MG
1000 TABLET, EXTENDED RELEASE 24 HR ORAL 2 TIMES DAILY WITH MEALS
Qty: 360 TABLET | Refills: 1 | Status: SHIPPED | OUTPATIENT
Start: 2024-09-24

## 2024-11-20 ENCOUNTER — TRANSFERRED RECORDS (OUTPATIENT)
Dept: MULTI SPECIALTY CLINIC | Facility: CLINIC | Age: 59
End: 2024-11-20

## 2024-11-20 LAB — RETINOPATHY: NORMAL

## 2024-12-05 ENCOUNTER — PATIENT OUTREACH (OUTPATIENT)
Dept: CARE COORDINATION | Facility: CLINIC | Age: 59
End: 2024-12-05
Payer: COMMERCIAL

## 2024-12-19 ENCOUNTER — PATIENT OUTREACH (OUTPATIENT)
Dept: CARE COORDINATION | Facility: CLINIC | Age: 59
End: 2024-12-19
Payer: COMMERCIAL

## 2025-02-22 ENCOUNTER — HEALTH MAINTENANCE LETTER (OUTPATIENT)
Age: 60
End: 2025-02-22

## 2025-04-09 ENCOUNTER — OFFICE VISIT (OUTPATIENT)
Dept: FAMILY MEDICINE | Facility: OTHER | Age: 60
End: 2025-04-09
Attending: PHYSICIAN ASSISTANT
Payer: COMMERCIAL

## 2025-04-09 VITALS
RESPIRATION RATE: 16 BRPM | HEART RATE: 90 BPM | SYSTOLIC BLOOD PRESSURE: 136 MMHG | OXYGEN SATURATION: 98 % | HEIGHT: 71 IN | DIASTOLIC BLOOD PRESSURE: 70 MMHG | WEIGHT: 228 LBS | TEMPERATURE: 97.5 F | BODY MASS INDEX: 31.92 KG/M2

## 2025-04-09 DIAGNOSIS — E78.5 HYPERLIPIDEMIA LDL GOAL <100: ICD-10-CM

## 2025-04-09 DIAGNOSIS — N18.2 CHRONIC KIDNEY DISEASE, STAGE 2 (MILD): ICD-10-CM

## 2025-04-09 DIAGNOSIS — E11.9 TYPE 2 DIABETES MELLITUS WITHOUT COMPLICATION, WITHOUT LONG-TERM CURRENT USE OF INSULIN (H): Primary | ICD-10-CM

## 2025-04-09 DIAGNOSIS — I10 ESSENTIAL HYPERTENSION: ICD-10-CM

## 2025-04-09 LAB
ALBUMIN SERPL BCG-MCNC: 4.7 G/DL (ref 3.5–5.2)
ALP SERPL-CCNC: 62 U/L (ref 40–150)
ALT SERPL W P-5'-P-CCNC: 13 U/L (ref 0–70)
ANION GAP SERPL CALCULATED.3IONS-SCNC: 13 MMOL/L (ref 7–15)
AST SERPL W P-5'-P-CCNC: 25 U/L (ref 0–45)
BILIRUB SERPL-MCNC: 0.9 MG/DL
BUN SERPL-MCNC: 17.7 MG/DL (ref 8–23)
CALCIUM SERPL-MCNC: 9.6 MG/DL (ref 8.8–10.4)
CHLORIDE SERPL-SCNC: 98 MMOL/L (ref 98–107)
CHOLEST SERPL-MCNC: 126 MG/DL
CREAT SERPL-MCNC: 1.21 MG/DL (ref 0.67–1.17)
CREAT UR-MCNC: 55.1 MG/DL
EGFRCR SERPLBLD CKD-EPI 2021: 68 ML/MIN/1.73M2
EST. AVERAGE GLUCOSE BLD GHB EST-MCNC: 154 MG/DL
FASTING STATUS PATIENT QL REPORTED: ABNORMAL
FASTING STATUS PATIENT QL REPORTED: NORMAL
GLUCOSE SERPL-MCNC: 140 MG/DL (ref 70–99)
HBA1C MFR BLD: 7 % (ref 0–5.6)
HCO3 SERPL-SCNC: 26 MMOL/L (ref 22–29)
HDLC SERPL-MCNC: 55 MG/DL
LDLC SERPL CALC-MCNC: 42 MG/DL
MICROALBUMIN UR-MCNC: 16.6 MG/L
MICROALBUMIN/CREAT UR: 30.13 MG/G CR (ref 0–17)
NONHDLC SERPL-MCNC: 71 MG/DL
POTASSIUM SERPL-SCNC: 4.7 MMOL/L (ref 3.4–5.3)
PROT SERPL-MCNC: 7.7 G/DL (ref 6.4–8.3)
SODIUM SERPL-SCNC: 137 MMOL/L (ref 135–145)
TRIGL SERPL-MCNC: 144 MG/DL

## 2025-04-09 PROCEDURE — 99207 PR FOOT EXAM NO CHARGE: CPT | Performed by: PHYSICIAN ASSISTANT

## 2025-04-09 PROCEDURE — 99214 OFFICE O/P EST MOD 30 MIN: CPT | Performed by: PHYSICIAN ASSISTANT

## 2025-04-09 PROCEDURE — 83036 HEMOGLOBIN GLYCOSYLATED A1C: CPT | Performed by: PHYSICIAN ASSISTANT

## 2025-04-09 PROCEDURE — G2211 COMPLEX E/M VISIT ADD ON: HCPCS | Performed by: PHYSICIAN ASSISTANT

## 2025-04-09 PROCEDURE — 3075F SYST BP GE 130 - 139MM HG: CPT | Performed by: PHYSICIAN ASSISTANT

## 2025-04-09 PROCEDURE — 36415 COLL VENOUS BLD VENIPUNCTURE: CPT | Performed by: PHYSICIAN ASSISTANT

## 2025-04-09 PROCEDURE — 82570 ASSAY OF URINE CREATININE: CPT | Performed by: PHYSICIAN ASSISTANT

## 2025-04-09 PROCEDURE — 80061 LIPID PANEL: CPT | Performed by: PHYSICIAN ASSISTANT

## 2025-04-09 PROCEDURE — 80053 COMPREHEN METABOLIC PANEL: CPT | Performed by: PHYSICIAN ASSISTANT

## 2025-04-09 PROCEDURE — 82043 UR ALBUMIN QUANTITATIVE: CPT | Performed by: PHYSICIAN ASSISTANT

## 2025-04-09 PROCEDURE — 1126F AMNT PAIN NOTED NONE PRSNT: CPT | Performed by: PHYSICIAN ASSISTANT

## 2025-04-09 PROCEDURE — 3078F DIAST BP <80 MM HG: CPT | Performed by: PHYSICIAN ASSISTANT

## 2025-04-09 RX ORDER — METFORMIN HYDROCHLORIDE 500 MG/1
1000 TABLET, EXTENDED RELEASE ORAL 2 TIMES DAILY WITH MEALS
Qty: 360 TABLET | Refills: 1 | Status: SHIPPED | OUTPATIENT
Start: 2025-04-09

## 2025-04-09 ASSESSMENT — PAIN SCALES - GENERAL: PAINLEVEL_OUTOF10: NO PAIN (0)

## 2025-04-09 NOTE — PROGRESS NOTES
Assessment & Plan       ICD-10-CM    1. Type 2 diabetes mellitus without complication, without long-term current use of insulin (H)  E11.9 Comprehensive metabolic panel (BMP + Alb, Alk Phos, ALT, AST, Total. Bili, TP)     Albumin Random Urine Quantitative with Creat Ratio     FOOT EXAM     Hemoglobin A1c     Comprehensive metabolic panel (BMP + Alb, Alk Phos, ALT, AST, Total. Bili, TP)     Albumin Random Urine Quantitative with Creat Ratio     Hemoglobin A1c     metFORMIN (GLUCOPHAGE XR) 500 MG 24 hr tablet      2. Chronic kidney disease, stage 2 (mild)  N18.2       3. Hyperlipidemia LDL goal <100  E78.5 Lipid panel reflex to direct LDL Fasting     Lipid panel reflex to direct LDL Fasting      4. Essential hypertension  I10           1-2. His diabetic control has improved with an A1c of 7.0 today. He will continue to work on a healthier, lower carb diet with more routine exercise. We again discussed adding an SGLT2 inhibitor like Jardibeltran given his concomitant CKD. He would like to continue to work on lifestyle changes and consider adding this at his next visit which is certainly very reasonable.     3. Updated fasting lipid panel ordered. Continue atorvastatin.    4. Blood pressure is stable.    Follow-up in 6 months.     The longitudinal plan of care for the diagnosis(es)/condition(s) as documented were addressed during this visit. Due to the added complexity in care, I will continue to support Taiwo in the subsequent management and with ongoing continuity of care.      Subjective   Taiwo is a 59 year old, presenting for the following health issues:  Recheck Medication, Diabetes, Hypertension, and Lipids        4/9/2025    10:44 AM   Additional Questions   Roomed by [facility]     Via the Health Maintenance questionnaire, the patient has reported the following services have been completed -Eye Exam: [facility] 2024-11-20, this information has been sent to the abstraction team.  History of Present Illness  "      Diabetes:   He presents for follow up of diabetes.  He is checking home blood glucose a few times a month.   He checks blood glucose before meals.  Blood glucose is never over 200 and never under 70.  When his blood glucose is low, the patient is asymptomatic for confusion, blurred vision, lethargy and reports not feeling dizzy, shaky, or weak.   He has no concerns regarding his diabetes at this time.   He is not experiencing numbness or burning in feet, excessive thirst, blurry vision, weight changes or redness, sores or blisters on feet. The patient has had a diabetic eye exam in the last 12 months. Eye exam performed on 11/2024. Location of last eye exam [facility].        He has been trying to be more active with a healthier diet since his last visit. He recently retired and is really enjoying his extra time.    Hyperlipidemia Follow-Up    Are you regularly taking any medication or supplement to lower your cholesterol?   Yes- Lipitor  Are you having muscle aches or other side effects that you think could be caused by your cholesterol lowering medication?  No    Hypertension Follow-up    Do you check your blood pressure regularly outside of the clinic? Yes   Are you following a low salt diet? Yes  Are your blood pressures ever more than 140 on the top number (systolic) OR more   than 90 on the bottom number (diastolic), for example 140/90? No    BP Readings from Last 2 Encounters:   04/09/25 136/70   01/04/24 114/78       Review of Systems  Constitutional, HEENT, cardiovascular, pulmonary, neuro, gi and gu systems are negative, except as otherwise noted.      Objective    /70   Pulse 90   Temp 97.5  F (36.4  C) (Temporal)   Resp 16   Ht 1.796 m (5' 10.71\")   Wt 103.4 kg (228 lb)   SpO2 98%   BMI 32.06 kg/m    Body mass index is 32.06 kg/m .  Physical Exam   GENERAL: alert and no distress  RESP: lungs clear to auscultation - no rales, rhonchi or wheezes  CV: regular rate and rhythm, normal " S1 S2, no S3 or S4, no murmur, click or rub, no peripheral edema  NEURO: Normal strength and tone, mentation intact and speech normal. Gait is stable.   PSYCH: mentation appears normal, affect normal/bright  Diabetic foot exam: normal DP and PT pulses, no trophic changes or ulcerative lesions, normal sensory exam, and normal monofilament exam      Lab Results   Component Value Date    A1C 7.0 04/09/2025    A1C 7.5 09/24/2024    A1C 7.2 01/04/2024    A1C 6.9 06/15/2023    A1C 6.8 09/16/2022    A1C 7.6 03/05/2021    A1C 6.9 07/29/2020    A1C 6.6 01/29/2020    A1C 6.3 08/15/2019    A1C 6.5 12/20/2018       Signed Electronically by: Moises Bell PA-C

## 2025-04-09 NOTE — PATIENT INSTRUCTIONS
Your A1c is down to 7.0 which is improved!  Continue with a low salt, low carb diet and routine exercise.    Will consider adding Jardiance in the future to help with glucose control and kidney protection.    Follow-up in 6 months.